# Patient Record
Sex: MALE | Race: WHITE | NOT HISPANIC OR LATINO | Employment: FULL TIME | URBAN - METROPOLITAN AREA
[De-identification: names, ages, dates, MRNs, and addresses within clinical notes are randomized per-mention and may not be internally consistent; named-entity substitution may affect disease eponyms.]

---

## 2023-06-28 DIAGNOSIS — K21.9 GASTROESOPHAGEAL REFLUX DISEASE, UNSPECIFIED WHETHER ESOPHAGITIS PRESENT: Primary | ICD-10-CM

## 2023-06-29 RX ORDER — PANTOPRAZOLE SODIUM 40 MG/1
TABLET, DELAYED RELEASE ORAL
Qty: 90 TABLET | Refills: 0 | Status: SHIPPED | OUTPATIENT
Start: 2023-06-29 | End: 2023-09-22

## 2023-09-20 DIAGNOSIS — K21.9 GASTROESOPHAGEAL REFLUX DISEASE, UNSPECIFIED WHETHER ESOPHAGITIS PRESENT: ICD-10-CM

## 2023-09-22 ENCOUNTER — TELEPHONE (OUTPATIENT)
Dept: PRIMARY CARE | Facility: CLINIC | Age: 58
End: 2023-09-22
Payer: COMMERCIAL

## 2023-09-22 RX ORDER — PANTOPRAZOLE SODIUM 40 MG/1
TABLET, DELAYED RELEASE ORAL
Qty: 30 TABLET | Refills: 0 | Status: SHIPPED | OUTPATIENT
Start: 2023-09-22 | End: 2023-10-17

## 2023-10-16 DIAGNOSIS — K21.9 GASTROESOPHAGEAL REFLUX DISEASE, UNSPECIFIED WHETHER ESOPHAGITIS PRESENT: ICD-10-CM

## 2023-10-17 RX ORDER — PANTOPRAZOLE SODIUM 40 MG/1
TABLET, DELAYED RELEASE ORAL
Qty: 30 TABLET | Refills: 0 | Status: SHIPPED | OUTPATIENT
Start: 2023-10-17 | End: 2024-01-17

## 2023-10-27 ENCOUNTER — OFFICE VISIT (OUTPATIENT)
Dept: PRIMARY CARE | Facility: CLINIC | Age: 58
End: 2023-10-27
Payer: COMMERCIAL

## 2023-10-27 VITALS
HEART RATE: 68 BPM | DIASTOLIC BLOOD PRESSURE: 74 MMHG | WEIGHT: 150.2 LBS | TEMPERATURE: 97.8 F | SYSTOLIC BLOOD PRESSURE: 114 MMHG | RESPIRATION RATE: 18 BRPM | OXYGEN SATURATION: 97 % | HEIGHT: 67 IN | BODY MASS INDEX: 23.57 KG/M2

## 2023-10-27 DIAGNOSIS — K22.719 BARRETT'S ESOPHAGUS WITH DYSPLASIA: ICD-10-CM

## 2023-10-27 DIAGNOSIS — Z12.5 SCREENING PSA (PROSTATE SPECIFIC ANTIGEN): ICD-10-CM

## 2023-10-27 DIAGNOSIS — K92.1 HEMATOCHEZIA: ICD-10-CM

## 2023-10-27 DIAGNOSIS — Z82.49 FAMILY HISTORY OF EARLY CAD: ICD-10-CM

## 2023-10-27 DIAGNOSIS — K21.9 GASTROESOPHAGEAL REFLUX DISEASE WITHOUT ESOPHAGITIS: ICD-10-CM

## 2023-10-27 DIAGNOSIS — J43.9 PULMONARY EMPHYSEMA, UNSPECIFIED EMPHYSEMA TYPE (MULTI): ICD-10-CM

## 2023-10-27 DIAGNOSIS — Z00.00 HEALTHCARE MAINTENANCE: Primary | ICD-10-CM

## 2023-10-27 DIAGNOSIS — D64.9 ANEMIA, UNSPECIFIED TYPE: ICD-10-CM

## 2023-10-27 PROBLEM — G43.109 OPHTHALMIC MIGRAINE: Status: ACTIVE | Noted: 2023-10-27

## 2023-10-27 PROBLEM — I73.00 RAYNAUD DISEASE: Status: ACTIVE | Noted: 2023-10-27

## 2023-10-27 LAB
POC APPEARANCE, URINE: CLEAR
POC BILIRUBIN, URINE: NEGATIVE
POC BLOOD, URINE: NEGATIVE
POC COLOR, URINE: YELLOW
POC GLUCOSE, URINE: NEGATIVE MG/DL
POC KETONES, URINE: NEGATIVE MG/DL
POC LEUKOCYTES, URINE: NEGATIVE
POC NITRITE,URINE: NEGATIVE
POC PH, URINE: 6 PH
POC PROTEIN, URINE: NEGATIVE MG/DL
POC SPECIFIC GRAVITY, URINE: 1.01
POC UROBILINOGEN, URINE: 0.2 EU/DL

## 2023-10-27 PROCEDURE — 93000 ELECTROCARDIOGRAM COMPLETE: CPT | Performed by: FAMILY MEDICINE

## 2023-10-27 PROCEDURE — 1036F TOBACCO NON-USER: CPT | Performed by: FAMILY MEDICINE

## 2023-10-27 PROCEDURE — 81002 URINALYSIS NONAUTO W/O SCOPE: CPT | Performed by: FAMILY MEDICINE

## 2023-10-27 PROCEDURE — 99396 PREV VISIT EST AGE 40-64: CPT | Performed by: FAMILY MEDICINE

## 2023-10-27 ASSESSMENT — PATIENT HEALTH QUESTIONNAIRE - PHQ9
SUM OF ALL RESPONSES TO PHQ9 QUESTIONS 1 AND 2: 0
2. FEELING DOWN, DEPRESSED OR HOPELESS: NOT AT ALL
1. LITTLE INTEREST OR PLEASURE IN DOING THINGS: NOT AT ALL

## 2023-10-27 ASSESSMENT — ENCOUNTER SYMPTOMS
HEADACHES: 0
SHORTNESS OF BREATH: 0

## 2023-10-27 NOTE — PROGRESS NOTES
"Subjective     Keenan Wharton is a 57 y.o. male who presents for Annual Exam.    HPI     Pt is here today for annual well exam.  He is doing well.    Review of Systems   Respiratory:  Negative for shortness of breath.    Cardiovascular:  Negative for chest pain.   Neurological:  Negative for headaches.       Objective     Vitals:    10/27/23 0854   BP: 114/74   BP Location: Left arm   Patient Position: Sitting   Pulse: 68   Resp: 18   Temp: 36.6 °C (97.8 °F)   TempSrc: Temporal   SpO2: 97%   Weight: 68.1 kg (150 lb 3.2 oz)   Height: 1.702 m (5' 7\")        Current Outpatient Medications   Medication Instructions    pantoprazole (ProtoNix) 40 mg EC tablet TAKE 1 TABLET BY MOUTH DAILY        Past Surgical History:   Procedure Laterality Date    OTHER SURGICAL HISTORY  08/11/2021    Esophagogastroduodenoscopy    OTHER SURGICAL HISTORY  08/11/2021    Colonoscopy        Social History     Tobacco Use    Smoking status: Never    Smokeless tobacco: Never        No family history on file.     Immunization History   Administered Date(s) Administered    Influenza Whole 11/29/2013    Influenza, High Dose Seasonal, Preservative Free 10/17/2014    Influenza, injectable, MDCK, preservative free, quadrivalent 12/28/2022    Influenza, injectable, quadrivalent 11/06/2017    Influenza, seasonal, injectable 10/09/2015, 10/12/2016, 11/14/2018, 09/25/2019, 10/20/2021    Pfizer COVID-19 vaccine, bivalent, age 12 years and older (30 mcg/0.3 mL) 10/16/2022    Pfizer Purple Cap SARS-CoV-2 03/16/2021, 04/06/2021, 12/20/2021    Tdap vaccine, age 7 year and older (BOOSTRIX) 08/12/2022    Zoster vaccine, recombinant, adult (SHINGRIX) 08/11/2021, 10/20/2021        Physical Exam  Vitals reviewed.   Constitutional:       General: He is not in acute distress.     Appearance: Normal appearance.   HENT:      Head: Normocephalic and atraumatic.      Right Ear: Tympanic membrane, ear canal and external ear normal.      Left Ear: Tympanic membrane, ear " canal and external ear normal.      Nose: Nose normal.      Mouth/Throat:      Mouth: Mucous membranes are moist.      Pharynx: Oropharynx is clear. No oropharyngeal exudate or posterior oropharyngeal erythema.   Eyes:      Extraocular Movements: Extraocular movements intact.      Pupils: Pupils are equal, round, and reactive to light.   Neck:      Thyroid: No thyroid mass or thyromegaly.   Cardiovascular:      Rate and Rhythm: Normal rate and regular rhythm.      Heart sounds: No murmur heard.  Pulmonary:      Effort: Pulmonary effort is normal. No respiratory distress.      Breath sounds: Normal breath sounds. No wheezing, rhonchi or rales.   Abdominal:      General: Abdomen is flat. There is no distension.      Palpations: Abdomen is soft.      Tenderness: There is no abdominal tenderness.   Genitourinary:     Testes: Normal.   Musculoskeletal:         General: Normal range of motion.   Lymphadenopathy:      Cervical: No cervical adenopathy.   Skin:     General: Skin is warm and dry.      Findings: No rash.   Neurological:      General: No focal deficit present.      Mental Status: He is alert and oriented to person, place, and time. Mental status is at baseline.   Psychiatric:         Mood and Affect: Mood normal.         Behavior: Behavior normal.         Problem List Items Addressed This Visit       Anemia    Relevant Orders    Referral to Benign Hematology    Iron and TIBC    Ferritin    Reticulocytes    Vitamin B12    Folate    Referral to Gastroenterology    Emphysema of lung (CMS/HCC)    Gastroesophageal reflux disease without esophagitis    Relevant Orders    Referral to Gastroenterology     Other Visit Diagnoses       Healthcare maintenance    -  Primary    Relevant Orders    Comprehensive Metabolic Panel    Lipid Panel    CBC and Auto Differential    Hemoglobin A1C    ECG 12 Lead (Completed)    POCT UA (nonautomated) manually resulted    Screening PSA (prostate specific antigen)        Relevant Orders     Prostate Specific Antigen    Hematochezia        Relevant Orders    Referral to Gastroenterology    Marie's esophagus with dysplasia        Relevant Orders    Referral to Gastroenterology    Family history of early CAD        Relevant Orders    Referral to Cardiology    ECG 12 Lead (Completed)            Assessment/Plan     Well Exam     Colon screening - utd with colonoscopy - 9/2020 ([facility]) - repeat 10 years per note; however pt having hematochezia so I recommend he see GI for possible colonoscopy.      Vaccines - utd with tdap and shingrix     Flu vaccine given today     I recommend yearly flu vaccine and routine COVID vaccinations as indicated     Complete labs    Healthy diet, routine exercise was discussed with patient      Anemia , chronic - sees  heme - on OTC fe supplement and vit C supplemnt   pt will follow up with heme and see GI    Barretts esophagus/GERD - last EGD 2020, see GI, referred , on PPI      skin screening - pt sees derm yearly , hx of AKs.     Family hx of MI in father, brothers, referred to cardio      Follow up in 1 year or sooner if needed

## 2023-10-30 ENCOUNTER — LAB (OUTPATIENT)
Dept: LAB | Facility: LAB | Age: 58
End: 2023-10-30
Payer: COMMERCIAL

## 2023-10-30 DIAGNOSIS — Z00.00 HEALTHCARE MAINTENANCE: ICD-10-CM

## 2023-10-30 DIAGNOSIS — D64.9 ANEMIA, UNSPECIFIED TYPE: ICD-10-CM

## 2023-10-30 DIAGNOSIS — Z12.5 SCREENING PSA (PROSTATE SPECIFIC ANTIGEN): ICD-10-CM

## 2023-10-30 LAB
ALBUMIN SERPL BCP-MCNC: 4.2 G/DL (ref 3.4–5)
ALP SERPL-CCNC: 45 U/L (ref 33–120)
ALT SERPL W P-5'-P-CCNC: 11 U/L (ref 10–52)
ANION GAP SERPL CALC-SCNC: 10 MMOL/L (ref 10–20)
AST SERPL W P-5'-P-CCNC: 15 U/L (ref 9–39)
BASOPHILS # BLD AUTO: 0.05 X10*3/UL (ref 0–0.1)
BASOPHILS NFR BLD AUTO: 1.1 %
BILIRUB SERPL-MCNC: 0.7 MG/DL (ref 0–1.2)
BUN SERPL-MCNC: 9 MG/DL (ref 6–23)
CALCIUM SERPL-MCNC: 9.3 MG/DL (ref 8.6–10.3)
CHLORIDE SERPL-SCNC: 104 MMOL/L (ref 98–107)
CHOLEST SERPL-MCNC: 196 MG/DL (ref 0–199)
CHOLESTEROL/HDL RATIO: 3.1
CO2 SERPL-SCNC: 30 MMOL/L (ref 21–32)
CREAT SERPL-MCNC: 0.86 MG/DL (ref 0.5–1.3)
EOSINOPHIL # BLD AUTO: 0.1 X10*3/UL (ref 0–0.7)
EOSINOPHIL NFR BLD AUTO: 2.3 %
ERYTHROCYTE [DISTWIDTH] IN BLOOD BY AUTOMATED COUNT: 12.6 % (ref 11.5–14.5)
EST. AVERAGE GLUCOSE BLD GHB EST-MCNC: 97 MG/DL
FERRITIN SERPL-MCNC: 39 NG/ML (ref 20–300)
FOLATE SERPL-MCNC: 17.4 NG/ML
GFR SERPL CREATININE-BSD FRML MDRD: >90 ML/MIN/1.73M*2
GLUCOSE SERPL-MCNC: 86 MG/DL (ref 74–99)
HBA1C MFR BLD: 5 %
HCT VFR BLD AUTO: 38.7 % (ref 41–52)
HDLC SERPL-MCNC: 63.8 MG/DL
HGB BLD-MCNC: 12.8 G/DL (ref 13.5–17.5)
HGB RETIC QN: 34 PG (ref 28–38)
IMM GRANULOCYTES # BLD AUTO: 0.01 X10*3/UL (ref 0–0.7)
IMM GRANULOCYTES NFR BLD AUTO: 0.2 % (ref 0–0.9)
IMMATURE RETIC FRACTION: 9.4 %
IRON SATN MFR SERPL: 26 % (ref 25–45)
IRON SERPL-MCNC: 112 UG/DL (ref 35–150)
LDLC SERPL CALC-MCNC: 116 MG/DL
LYMPHOCYTES # BLD AUTO: 1.13 X10*3/UL (ref 1.2–4.8)
LYMPHOCYTES NFR BLD AUTO: 25.7 %
MCH RBC QN AUTO: 30.5 PG (ref 26–34)
MCHC RBC AUTO-ENTMCNC: 33.1 G/DL (ref 32–36)
MCV RBC AUTO: 92 FL (ref 80–100)
MONOCYTES # BLD AUTO: 0.36 X10*3/UL (ref 0.1–1)
MONOCYTES NFR BLD AUTO: 8.2 %
NEUTROPHILS # BLD AUTO: 2.75 X10*3/UL (ref 1.2–7.7)
NEUTROPHILS NFR BLD AUTO: 62.5 %
NON HDL CHOLESTEROL: 132 MG/DL (ref 0–149)
NRBC BLD-RTO: 0 /100 WBCS (ref 0–0)
PLATELET # BLD AUTO: 238 X10*3/UL (ref 150–450)
PMV BLD AUTO: 10.8 FL (ref 7.5–11.5)
POTASSIUM SERPL-SCNC: 4 MMOL/L (ref 3.5–5.3)
PROT SERPL-MCNC: 6.5 G/DL (ref 6.4–8.2)
PSA SERPL-MCNC: 1.32 NG/ML
RBC # BLD AUTO: 4.2 X10*6/UL (ref 4.5–5.9)
RETICS #: 0.06 X10*6/UL (ref 0.02–0.12)
RETICS/RBC NFR AUTO: 1.5 % (ref 0.5–2)
SODIUM SERPL-SCNC: 140 MMOL/L (ref 136–145)
TIBC SERPL-MCNC: 434 UG/DL (ref 240–445)
TRIGL SERPL-MCNC: 80 MG/DL (ref 0–149)
UIBC SERPL-MCNC: 322 UG/DL (ref 110–370)
VIT B12 SERPL-MCNC: 285 PG/ML (ref 211–911)
VLDL: 16 MG/DL (ref 0–40)
WBC # BLD AUTO: 4.4 X10*3/UL (ref 4.4–11.3)

## 2023-10-30 PROCEDURE — 83036 HEMOGLOBIN GLYCOSYLATED A1C: CPT

## 2023-10-30 PROCEDURE — 83540 ASSAY OF IRON: CPT

## 2023-10-30 PROCEDURE — 82728 ASSAY OF FERRITIN: CPT

## 2023-10-30 PROCEDURE — 85045 AUTOMATED RETICULOCYTE COUNT: CPT

## 2023-10-30 PROCEDURE — 80053 COMPREHEN METABOLIC PANEL: CPT

## 2023-10-30 PROCEDURE — 36415 COLL VENOUS BLD VENIPUNCTURE: CPT

## 2023-10-30 PROCEDURE — 82746 ASSAY OF FOLIC ACID SERUM: CPT

## 2023-10-30 PROCEDURE — 80061 LIPID PANEL: CPT

## 2023-10-30 PROCEDURE — 82607 VITAMIN B-12: CPT

## 2023-10-30 PROCEDURE — 84153 ASSAY OF PSA TOTAL: CPT

## 2023-10-30 PROCEDURE — 85025 COMPLETE CBC W/AUTO DIFF WBC: CPT

## 2023-10-30 PROCEDURE — 83550 IRON BINDING TEST: CPT

## 2023-11-29 PROBLEM — R93.89 ABNORMAL CT SCAN: Status: ACTIVE | Noted: 2023-11-29

## 2023-11-29 PROBLEM — R35.1 NOCTURIA: Status: ACTIVE | Noted: 2023-11-29

## 2023-11-29 PROBLEM — D72.819 WBC DECREASED: Status: ACTIVE | Noted: 2023-11-29

## 2023-11-29 RX ORDER — RISEDRONATE SODIUM 150 MG/1
150 TABLET, FILM COATED ORAL
COMMUNITY
Start: 2020-06-23 | End: 2024-03-25 | Stop reason: WASHOUT

## 2023-11-29 RX ORDER — INFLUENZA A VIRUS A/DELAWARE/55/2019 CVR-45 (H1N1) ANTIGEN (MDCK CELL DERIVED, PROPIOLACTONE INACTIVATED), INFLUENZA A VIRUS A/DARWIN/11/2021 (H3N2) ANTIGEN (MDCK CELL DERIVED, PROPIOLACTONE INACTIVATED), INFLUENZA B VIRUS B/SINGAPORE/WUH4618/2021 ANTIGEN (MDCK CELL DERIVED, PROPIOLACTONE INACTIVATED), INFLUENZA B VIRUS B/SINGAPORE/INFTT-16-0610/2016 ANTIGEN (MDCK CELL DERIVED, PROPIOLACTONE INACTIVATED) 15; 15; 15; 15 UG/.5ML; UG/.5ML; UG/.5ML; UG/.5ML
INJECTION, SUSPENSION INTRAMUSCULAR
COMMUNITY
Start: 2022-12-28 | End: 2024-04-08 | Stop reason: WASHOUT

## 2023-11-29 RX ORDER — PHENOL 1.4 %
1 AEROSOL, SPRAY (ML) MUCOUS MEMBRANE DAILY
COMMUNITY
Start: 2021-08-11 | End: 2024-03-25 | Stop reason: WASHOUT

## 2023-11-30 ENCOUNTER — OFFICE VISIT (OUTPATIENT)
Dept: HEMATOLOGY/ONCOLOGY | Facility: CLINIC | Age: 58
End: 2023-11-30
Payer: COMMERCIAL

## 2023-11-30 VITALS
SYSTOLIC BLOOD PRESSURE: 112 MMHG | HEART RATE: 59 BPM | BODY MASS INDEX: 23.69 KG/M2 | DIASTOLIC BLOOD PRESSURE: 73 MMHG | OXYGEN SATURATION: 97 % | TEMPERATURE: 97.5 F | WEIGHT: 151.24 LBS | RESPIRATION RATE: 18 BRPM

## 2023-11-30 DIAGNOSIS — D64.9 ANEMIA, UNSPECIFIED TYPE: ICD-10-CM

## 2023-11-30 PROCEDURE — 99214 OFFICE O/P EST MOD 30 MIN: CPT | Performed by: PHYSICIAN ASSISTANT

## 2023-11-30 PROCEDURE — 1036F TOBACCO NON-USER: CPT | Performed by: PHYSICIAN ASSISTANT

## 2023-11-30 RX ORDER — FERROUS SULFATE 325(65) MG
65 TABLET, DELAYED RELEASE (ENTERIC COATED) ORAL
COMMUNITY

## 2023-11-30 ASSESSMENT — ENCOUNTER SYMPTOMS
DIFFICULTY URINATING: 0
ABDOMINAL DISTENTION: 0
DEPRESSION: 0
CONSTITUTIONAL NEGATIVE: 1
SCLERAL ICTERUS: 0
DIAPHORESIS: 0
FREQUENCY: 0
HEADACHES: 0
SPEECH DIFFICULTY: 0
OCCASIONAL FEELINGS OF UNSTEADINESS: 0
VOMITING: 0
CONFUSION: 0
CARDIOVASCULAR NEGATIVE: 1
DIARRHEA: 0
NAUSEA: 0
DIZZINESS: 0
HEMATURIA: 0
ABDOMINAL PAIN: 0
CHILLS: 0
HEMATOLOGIC/LYMPHATIC NEGATIVE: 1
WOUND: 0
DECREASED CONCENTRATION: 0
NECK STIFFNESS: 0
RESPIRATORY NEGATIVE: 1
LOSS OF SENSATION IN FEET: 0
EYE PROBLEMS: 0
CONSTIPATION: 0
APPETITE CHANGE: 0
NECK PAIN: 0
NUMBNESS: 0

## 2023-11-30 ASSESSMENT — PAIN SCALES - GENERAL: PAINLEVEL: 0-NO PAIN

## 2023-11-30 NOTE — PROGRESS NOTES
"Patient ID: Keenan Wharton is a 58 y.o. male.  Primary care physician:     Interval History:   9/28/2021: Initial visit   -h/o progressive normocytic anemia, starting July 2020. On 9/20/21  WBC 3.4, Hgb 10.2, Hct 32.8, MCV 84, Plt 226; iron 43, TIBC >493, sat NC, ferritin 12  -has been on a \"blood builder\"  supplement which contains iron for about 3 months.  -has noticed mild increase in fatigue, but especially less exercise tolerance with some HELMS. He is an active runner and has had to cut back. Possible restless legs at night.  -denies indigestion/reflux but is on protonix to control this. Denies n/v, c/d, melena, hematochezia. Occ BRBPR attributed to hemorrhoids noted on colonoscopy 9/2020. denies hematuria  -eats a regular diet, no gluten intolerance, weight stable.  -denies HELMS, chest pain, abd pain/distention.  -9/2020  colonoscopy/EGD: +hemorrhoids, otherwise unremarkable per patient    Selected labs prior to the initial consult:   11/11/2008  WBC 4.0, Hgb 12.3, Hct 38.1, Plt 212; iron 57, TIBC 459, sat 12%  6/14/2011 WBC 4.7, Hgb 13.3, Hct 40, Plt 203; iron 50, TIBC 363, sat 14%, ferr 30  10/22/2015  wBC 4.3, ANC 2.57, ALC 1.19, Hgb 13.4, Hct 40.8, Plt 189  1/26/2018  WBC 4.5, ANC 2.6, Hgb 13, Hct 39.5, Plt 210  12/3/2018  WBC 4.1, ANC 2.27, Hgb 13.1, Hct 39.7, MCV 94, Plt 198  9/27/2019  wBC 4.0, ANC 2.46, Hgb 13.3, Hct 41.1, Plt 206  7/2/2020  wBC 3.6, ANC 1.87, ALC 1.17, Hgb 12.2, Hct 37.2, MCV 93, Plt 194; tSH 1.25, PSA 1.48  8/13/2021  WBC 3.6, ANC 2.22, ALC 0.7, Hgb 10.4, Hct 33.8, MCV 86, Plt 224; Hep C neg; creat 0.96, Ca+ 9.3, t pro 7.2, t bili 0.4, ast 17, alt 10, alkp 43  9/20/2021  wBC 3.4, Hgb 10.2, Hct 32.8, MCV 84, Plt 226; retic 1.1; Vit B12 352, folate 21.3, iron 43, TIBC >493, sat NC, ferr 12    Sep, 2021: Advised patient to start on oral iron once per day.     8/1/22: , SPEP with IF normal,     Subjective    -Returned for follow up for anemia. Patient lost follow up since March, " 2022  -Clinically doing well today. Admits reasonable energy level. States that he is no longer feeling sluggish.   -States that he is taking blood builder and multi-vitamins daily.       Social History     Tobacco Use    Smoking status: Never    Smokeless tobacco: Never        Objective    BMI:   Body mass index is 23.69 kg/m².     Vitals:   Visit Vitals  Smoking Status Never     Vitals:    11/30/23 0807   BP: 112/73   Pulse: 59   Resp: 18   Temp: 36.4 °C (97.5 °F)   SpO2: 97%        Review of Systems   Constitutional: Negative.  Negative for appetite change, chills and diaphoresis.   HENT:   Negative for lump/mass, mouth sores and nosebleeds.    Eyes:  Negative for eye problems and icterus.   Respiratory: Negative.     Cardiovascular: Negative.    Gastrointestinal:  Negative for abdominal distention, abdominal pain, constipation, diarrhea, nausea and vomiting.   Genitourinary:  Negative for bladder incontinence, difficulty urinating, frequency and hematuria.    Musculoskeletal:  Negative for gait problem, neck pain and neck stiffness.   Skin:  Negative for itching, rash and wound.   Neurological:  Negative for dizziness, gait problem, headaches, numbness and speech difficulty.   Hematological: Negative.    Psychiatric/Behavioral:  Negative for confusion and decreased concentration.         Physical Exam  Constitutional:       General: He is not in acute distress.     Appearance: Normal appearance.   HENT:      Head: Normocephalic and atraumatic.      Nose: Nose normal.      Mouth/Throat:      Mouth: Mucous membranes are moist.      Pharynx: Oropharynx is clear. No oropharyngeal exudate or posterior oropharyngeal erythema.   Eyes:      General: No scleral icterus.     Extraocular Movements: Extraocular movements intact.      Conjunctiva/sclera: Conjunctivae normal.   Cardiovascular:      Rate and Rhythm: Normal rate and regular rhythm.   Pulmonary:      Effort: Pulmonary effort is normal. No respiratory distress.  "     Breath sounds: Normal breath sounds. No wheezing.   Musculoskeletal:         General: No swelling, deformity or signs of injury. Normal range of motion.      Cervical back: Normal range of motion.   Skin:     General: Skin is warm and dry.      Coloration: Skin is not jaundiced.      Findings: No bruising, lesion or rash.   Neurological:      General: No focal deficit present.      Mental Status: He is alert and oriented to person, place, and time. Mental status is at baseline.         Labs:  Lab Results   Component Value Date    WBC 4.4 10/30/2023    NEUTROABS 2.75 10/30/2023    IGABSOL 0.01 10/30/2023    LYMPHSABS 1.13 (L) 10/30/2023    MONOSABS 0.36 10/30/2023    EOSABS 0.10 10/30/2023    BASOSABS 0.05 10/30/2023    RBC 4.20 (L) 10/30/2023    MCV 92 10/30/2023    MCHC 33.1 10/30/2023    HGB 12.8 (L) 10/30/2023    HCT 38.7 (L) 10/30/2023     10/30/2023     Lab Results   Component Value Date    RETICCTPCT 1.5 10/30/2023      Lab Results   Component Value Date    CREATININE 0.86 10/30/2023    BUN 9 10/30/2023    EGFR >90 10/30/2023     10/30/2023    K 4.0 10/30/2023     10/30/2023    CO2 30 10/30/2023      Lab Results   Component Value Date    ALT 11 10/30/2023    AST 15 10/30/2023    ALKPHOS 45 10/30/2023    BILITOT 0.7 10/30/2023      Lab Results   Component Value Date    TSH 1.25 07/02/2020     Lab Results   Component Value Date    TSH 1.25 07/02/2020     Lab Results   Component Value Date    IRON 112 10/30/2023    TIBC 434 10/30/2023    FERRITIN 39 10/30/2023      Lab Results   Component Value Date    FFLQWAFO69 285 10/30/2023      Lab Results   Component Value Date    FOLATE 17.4 10/30/2023     Lab Results   Component Value Date     08/01/2022     No results found for: \"HAPTOGLOBIN\"  Lab Results   Component Value Date    SPEP NORMAL 08/01/2022          Performance Status:  Asymptomatic    Assessment/Plan      Anemia  -normocytic anemia  -Labs done on 10/30/23: Hgb 12.8, MCV 92, " ferritin 39, iron saturation 26%, reticulocyte counts 1.5%, vitamin B12 285, folate 17.4, creatinine 0.86  -Previously plasma dyscrasia excluded.    -Currently, patient is taking blood builder and multi-vitamins daily.   -Since his iron level and vitamin B12 are borderline low, advised patient to start on ferrous sulfate one tab per day and vitamin B12 1000 mcg once per day.   -I will see him back in 3 months to follow up with labs done a few days prior to the next vsiit.     2. GERD   -Currently on pantoprazole     Problem List Items Addressed This Visit             ICD-10-CM    Anemia D64.9    Relevant Orders    Vitamin B12    Iron and TIBC    Ferritin    CBC and Auto Differential    Clinic Appointment Request Follow Up; MAGDA PEDRAZA; [facility]            Magda Pedraza PA-C

## 2023-11-30 NOTE — PATIENT INSTRUCTIONS
You will start on ferrous sulfate 325 mg, once per day and vitamin B12 1000 mcg once per day.     I will see you back in 3 months to follow up with labs done a few days prior to the next visit.

## 2024-01-16 DIAGNOSIS — K21.9 GASTROESOPHAGEAL REFLUX DISEASE, UNSPECIFIED WHETHER ESOPHAGITIS PRESENT: ICD-10-CM

## 2024-01-17 RX ORDER — PANTOPRAZOLE SODIUM 40 MG/1
TABLET, DELAYED RELEASE ORAL
Qty: 90 TABLET | Refills: 3 | Status: SHIPPED | OUTPATIENT
Start: 2024-01-17

## 2024-02-26 ENCOUNTER — TELEPHONE (OUTPATIENT)
Dept: HEMATOLOGY/ONCOLOGY | Facility: CLINIC | Age: 59
End: 2024-02-26
Payer: COMMERCIAL

## 2024-02-26 ENCOUNTER — LAB (OUTPATIENT)
Dept: LAB | Facility: CLINIC | Age: 59
End: 2024-02-26
Payer: COMMERCIAL

## 2024-02-26 DIAGNOSIS — D64.9 ANEMIA, UNSPECIFIED TYPE: ICD-10-CM

## 2024-02-26 DIAGNOSIS — D64.9 ANEMIA, UNSPECIFIED TYPE: Primary | ICD-10-CM

## 2024-02-26 LAB
BASOPHILS # BLD AUTO: 0.04 X10*3/UL (ref 0–0.1)
BASOPHILS NFR BLD AUTO: 0.8 %
EOSINOPHIL # BLD AUTO: 0.06 X10*3/UL (ref 0–0.7)
EOSINOPHIL NFR BLD AUTO: 1.2 %
ERYTHROCYTE [DISTWIDTH] IN BLOOD BY AUTOMATED COUNT: 12.5 % (ref 11.5–14.5)
FERRITIN SERPL-MCNC: 21 NG/ML (ref 20–300)
HCT VFR BLD AUTO: 34.7 % (ref 41–52)
HGB BLD-MCNC: 11.8 G/DL (ref 13.5–17.5)
IMM GRANULOCYTES # BLD AUTO: 0.01 X10*3/UL (ref 0–0.7)
IMM GRANULOCYTES NFR BLD AUTO: 0.2 % (ref 0–0.9)
IRON SATN MFR SERPL: 6 % (ref 25–45)
IRON SERPL-MCNC: 26 UG/DL (ref 35–150)
LYMPHOCYTES # BLD AUTO: 1.08 X10*3/UL (ref 1.2–4.8)
LYMPHOCYTES NFR BLD AUTO: 20.8 %
MCH RBC QN AUTO: 31 PG (ref 26–34)
MCHC RBC AUTO-ENTMCNC: 34 G/DL (ref 32–36)
MCV RBC AUTO: 91 FL (ref 80–100)
MONOCYTES # BLD AUTO: 0.38 X10*3/UL (ref 0.1–1)
MONOCYTES NFR BLD AUTO: 7.3 %
NEUTROPHILS # BLD AUTO: 3.62 X10*3/UL (ref 1.2–7.7)
NEUTROPHILS NFR BLD AUTO: 69.7 %
NRBC BLD-RTO: ABNORMAL /100{WBCS}
PLATELET # BLD AUTO: 231 X10*3/UL (ref 150–450)
RBC # BLD AUTO: 3.81 X10*6/UL (ref 4.5–5.9)
TIBC SERPL-MCNC: 425 UG/DL (ref 240–445)
UIBC SERPL-MCNC: 399 UG/DL (ref 110–370)
VIT B12 SERPL-MCNC: 647 PG/ML (ref 211–911)
WBC # BLD AUTO: 5.2 X10*3/UL (ref 4.4–11.3)

## 2024-02-26 PROCEDURE — 85025 COMPLETE CBC W/AUTO DIFF WBC: CPT

## 2024-02-26 PROCEDURE — 82728 ASSAY OF FERRITIN: CPT

## 2024-02-26 PROCEDURE — 82607 VITAMIN B-12: CPT

## 2024-02-26 PROCEDURE — 36415 COLL VENOUS BLD VENIPUNCTURE: CPT

## 2024-02-26 PROCEDURE — 83540 ASSAY OF IRON: CPT

## 2024-02-29 ENCOUNTER — APPOINTMENT (OUTPATIENT)
Dept: HEMATOLOGY/ONCOLOGY | Facility: CLINIC | Age: 59
End: 2024-02-29
Payer: COMMERCIAL

## 2024-03-20 ENCOUNTER — OFFICE VISIT (OUTPATIENT)
Dept: GASTROENTEROLOGY | Facility: CLINIC | Age: 59
End: 2024-03-20
Payer: COMMERCIAL

## 2024-03-20 VITALS
WEIGHT: 150 LBS | DIASTOLIC BLOOD PRESSURE: 85 MMHG | BODY MASS INDEX: 23.54 KG/M2 | SYSTOLIC BLOOD PRESSURE: 142 MMHG | HEIGHT: 67 IN | HEART RATE: 74 BPM

## 2024-03-20 DIAGNOSIS — D50.0 IRON DEFICIENCY ANEMIA DUE TO CHRONIC BLOOD LOSS: Primary | ICD-10-CM

## 2024-03-20 DIAGNOSIS — Z12.11 COLON CANCER SCREENING: ICD-10-CM

## 2024-03-20 PROCEDURE — 1036F TOBACCO NON-USER: CPT | Performed by: STUDENT IN AN ORGANIZED HEALTH CARE EDUCATION/TRAINING PROGRAM

## 2024-03-20 PROCEDURE — 99205 OFFICE O/P NEW HI 60 MIN: CPT | Performed by: STUDENT IN AN ORGANIZED HEALTH CARE EDUCATION/TRAINING PROGRAM

## 2024-03-20 RX ORDER — SODIUM, POTASSIUM,MAG SULFATES 17.5-3.13G
1 SOLUTION, RECONSTITUTED, ORAL ORAL SEE ADMIN INSTRUCTIONS
Qty: 2 EACH | Refills: 0 | Status: SHIPPED | OUTPATIENT
Start: 2024-03-20 | End: 2024-05-15 | Stop reason: ALTCHOICE

## 2024-03-20 RX ORDER — CYANOCOBALAMIN (VITAMIN B-12) 250 MCG
250 TABLET ORAL DAILY
COMMUNITY

## 2024-03-20 NOTE — PROGRESS NOTES
Subjective     History of Present Illness:   Keenan Wharton is a 58 y.o. male with hx of emphysema, Raynaud disease and chronic NATIVIDAD dating back to 2021 who presents to clinic for evaluation of worsening hematochezia.     Patient states he has been on iron supplementation for the past year however his ferritin most recently was measured at 21.  His most concerning symptom at this time is that he has been having ongoing hematochezia at times filling out the toilet bowl.  The hematochezia is intermittent, he has not had any episodes of blood in his stool this past week.  He has no abdominal pain, weight loss.  He has no melena.  He has no nausea or vomiting.    He completed upper endoscopy and colonoscopy in 2020 with concern for BE (C1M1) but biopsies were negative for intestinal metaplasia. Colonoscopy showed internal hemorrhoids. He has no family hx of GI malignancy      Past Medical History   has no past medical history on file.     Social History   reports that he has never smoked. He has never used smokeless tobacco. He reports current alcohol use. He reports that he does not currently use drugs.     Family History  family history is not on file.     Allergies  Allergies   Allergen Reactions    Lactose Nausea/vomiting     Lactose intolerant       Medications  Current Outpatient Medications   Medication Instructions    cyanocobalamin (VITAMIN B-12) 250 mcg, oral, Daily    ferrous sulfate 65 mg, oral, Daily with breakfast, Do not crush, chew, or split.    Flucelvax Quad [id number], PF, 60 mcg (15 mcg x 4)/0.5 mL syringe     multivit,calc,min/FA/K1/lycop (ONE-A-DAY MEN'S COMPLETE ORAL) oral    multivitamin with minerals (Adults Multivitamin) tablet 1 tablet, oral, Daily    pantoprazole (ProtoNix) 40 mg EC tablet TAKE 1 TABLET BY MOUTH DAILY    risedronate (ACTONEL) 150 mg, oral, Every 30 days    sodium,potassium,mag sulfates (Suprep) 17.5-3.13-1.6 gram recon soln solution 2 bottles, oral, See admin instructions         Objective   Visit Vitals  /85   Pulse 74      Physical Exam  Vitals reviewed.   Constitutional:       Appearance: Normal appearance.   HENT:      Head: Normocephalic.      Mouth/Throat:      Mouth: Mucous membranes are moist.   Cardiovascular:      Rate and Rhythm: Normal rate and regular rhythm.   Pulmonary:      Effort: Pulmonary effort is normal.      Breath sounds: Normal breath sounds.   Abdominal:      General: Abdomen is flat.   Neurological:      General: No focal deficit present.      Mental Status: He is alert.   Psychiatric:         Mood and Affect: Mood normal.         Judgment: Judgment normal.         Assessment/Plan   Keenan Wharton is a 58 y.o. male with hx of emphysema, Raynaud disease and chronic NATIVIDAD dating back to 2021 who presents to clinic for evaluation of worsening hematochezia and ongoing NATIVIDAD despite iron supplementation    He has ongoing iron deficiency anemia despite iron supplementation for the past year.  Given worsening hematochezia we will plan on repeat bidirectional endoscopy.  If this is unremarkable will need small bowel evaluation to complete workup.    Problem List Items Addressed This Visit       Anemia - Primary    Relevant Orders    Colonoscopy Diagnostic (hematochezia)    EGD              Massiel Godwin MD         My final recommendations will be communicated back to the requesting physician by way of shared Medical record or letter to requesting physician via fax.

## 2024-03-24 ASSESSMENT — ENCOUNTER SYMPTOMS
NUMBNESS: 0
HEMATURIA: 0
NECK STIFFNESS: 0
CHILLS: 0
NECK PAIN: 0
ABDOMINAL PAIN: 0
ABDOMINAL DISTENTION: 0
DIAPHORESIS: 0
WOUND: 0
FREQUENCY: 0
RESPIRATORY NEGATIVE: 1
DIZZINESS: 0
HEADACHES: 0
CONSTIPATION: 0
CONSTITUTIONAL NEGATIVE: 1
APPETITE CHANGE: 0
NAUSEA: 0
DECREASED CONCENTRATION: 0
SCLERAL ICTERUS: 0
DIFFICULTY URINATING: 0
CONFUSION: 0
DIARRHEA: 0
HEMATOLOGIC/LYMPHATIC NEGATIVE: 1
VOMITING: 0
EYE PROBLEMS: 0
SPEECH DIFFICULTY: 0
CARDIOVASCULAR NEGATIVE: 1

## 2024-03-25 ENCOUNTER — OFFICE VISIT (OUTPATIENT)
Dept: HEMATOLOGY/ONCOLOGY | Facility: CLINIC | Age: 59
End: 2024-03-25
Payer: COMMERCIAL

## 2024-03-25 VITALS
HEART RATE: 59 BPM | SYSTOLIC BLOOD PRESSURE: 112 MMHG | WEIGHT: 150.24 LBS | BODY MASS INDEX: 23.53 KG/M2 | DIASTOLIC BLOOD PRESSURE: 73 MMHG | RESPIRATION RATE: 16 BRPM | OXYGEN SATURATION: 99 % | TEMPERATURE: 96.6 F

## 2024-03-25 DIAGNOSIS — D64.9 ANEMIA, UNSPECIFIED TYPE: ICD-10-CM

## 2024-03-25 PROCEDURE — 1036F TOBACCO NON-USER: CPT

## 2024-03-25 PROCEDURE — 99214 OFFICE O/P EST MOD 30 MIN: CPT

## 2024-03-25 ASSESSMENT — COLUMBIA-SUICIDE SEVERITY RATING SCALE - C-SSRS
6. HAVE YOU EVER DONE ANYTHING, STARTED TO DO ANYTHING, OR PREPARED TO DO ANYTHING TO END YOUR LIFE?: NO
1. IN THE PAST MONTH, HAVE YOU WISHED YOU WERE DEAD OR WISHED YOU COULD GO TO SLEEP AND NOT WAKE UP?: NO
2. HAVE YOU ACTUALLY HAD ANY THOUGHTS OF KILLING YOURSELF?: NO

## 2024-03-25 ASSESSMENT — PATIENT HEALTH QUESTIONNAIRE - PHQ9
1. LITTLE INTEREST OR PLEASURE IN DOING THINGS: NOT AT ALL
SUM OF ALL RESPONSES TO PHQ9 QUESTIONS 1 AND 2: 0
2. FEELING DOWN, DEPRESSED OR HOPELESS: NOT AT ALL

## 2024-03-25 ASSESSMENT — PAIN SCALES - GENERAL: PAINLEVEL: 0-NO PAIN

## 2024-03-25 NOTE — PROGRESS NOTES
"Patient ID: Keenan Wharton is a 58 y.o. male.  Primary care physician:     Interval History:   9/28/2021: Initial visit   -h/o progressive normocytic anemia, starting July 2020. On 9/20/21  WBC 3.4, Hgb 10.2, Hct 32.8, MCV 84, Plt 226; iron 43, TIBC >493, sat NC, ferritin 12  -has been on a \"blood builder\"  supplement which contains iron for about 3 months.  -has noticed mild increase in fatigue, but especially less exercise tolerance with some HELMS. He is an active runner and has had to cut back. Possible restless legs at night.  -denies indigestion/reflux but is on protonix to control this. Denies n/v, c/d, melena, hematochezia. Occ BRBPR attributed to hemorrhoids noted on colonoscopy 9/2020. denies hematuria  -eats a regular diet, no gluten intolerance, weight stable.  -denies HELMS, chest pain, abd pain/distention.  -9/2020  colonoscopy/EGD: +hemorrhoids, otherwise unremarkable per patient    Selected labs prior to the initial consult:   11/11/2008  WBC 4.0, Hgb 12.3, Hct 38.1, Plt 212; iron 57, TIBC 459, sat 12%  6/14/2011 WBC 4.7, Hgb 13.3, Hct 40, Plt 203; iron 50, TIBC 363, sat 14%, ferr 30  10/22/2015  wBC 4.3, ANC 2.57, ALC 1.19, Hgb 13.4, Hct 40.8, Plt 189  1/26/2018  WBC 4.5, ANC 2.6, Hgb 13, Hct 39.5, Plt 210  12/3/2018  WBC 4.1, ANC 2.27, Hgb 13.1, Hct 39.7, MCV 94, Plt 198  9/27/2019  wBC 4.0, ANC 2.46, Hgb 13.3, Hct 41.1, Plt 206  7/2/2020  wBC 3.6, ANC 1.87, ALC 1.17, Hgb 12.2, Hct 37.2, MCV 93, Plt 194; tSH 1.25, PSA 1.48  8/13/2021  WBC 3.6, ANC 2.22, ALC 0.7, Hgb 10.4, Hct 33.8, MCV 86, Plt 224; Hep C neg; creat 0.96, Ca+ 9.3, t pro 7.2, t bili 0.4, ast 17, alt 10, alkp 43  9/20/2021  wBC 3.4, Hgb 10.2, Hct 32.8, MCV 84, Plt 226; retic 1.1; Vit B12 352, folate 21.3, iron 43, TIBC >493, sat NC, ferr 12    Sep, 2021: Advised patient to start on oral iron once per day.     8/1/22: , SPEP with IF normal,     Subjective    11/30/24:  -Returned for follow up for anemia. Patient lost follow up since " March, 2022  -Clinically doing well today. Admits reasonable energy level. States that he is no longer feeling sluggish.   -States that he is taking blood builder and multi-vitamins daily.     3/25/2024:   - Patient presents for follow-up visit, his previous provider relocated therefore I have taken over his care, this is our first encounter together  - Patient reports that he saw GI last week due to ongoing hematochezia, they recommended a colonoscopy and EGD  - Patient states that he has not had any hematochezia in the last 2 weeks  - Patient reports he has a family history of esophageal cancer, he does have reflux but says it is controlled and denies dysphagia  - Patient reports that he has been feeling well and that his energy is good  - He states that he has no complaints or symptoms going on, and denies any changes or hospital stays since last visit  - Patient denies shortness of breath, chest pain, heart palpitations, melena, nausea, vomiting, constipation, diarrhea, fevers, chills, new lumps or bumps, edema or concerns  - Patient is on Protonix 40 mg daily, iron supplementation tablet with vitamin C daily, and vitamin B12 supplemental tablet once daily      Social History     Tobacco Use    Smoking status: Never    Smokeless tobacco: Never   Substance Use Topics    Alcohol use: Yes     Comment: 10 drinks a week    Drug use: Not Currently        Objective    Visit Vitals  /73 (BP Location: Left arm, Patient Position: Sitting)   Pulse 59   Temp 35.9 °C (96.6 °F)   Resp 16   Wt 68.2 kg (150 lb 3.9 oz)   SpO2 99%   BMI 23.53 kg/m²   Smoking Status Never   BSA 1.8 m²           Review of Systems   Constitutional: Negative.  Negative for appetite change, chills and diaphoresis.   HENT:   Negative for lump/mass, mouth sores and nosebleeds.    Eyes:  Negative for eye problems and icterus.   Respiratory: Negative.     Cardiovascular: Negative.    Gastrointestinal:  Negative for abdominal distention, abdominal  pain, constipation, diarrhea, nausea and vomiting.   Genitourinary:  Negative for bladder incontinence, difficulty urinating, frequency and hematuria.    Musculoskeletal:  Negative for gait problem, neck pain and neck stiffness.   Skin:  Negative for itching, rash and wound.   Neurological:  Negative for dizziness, gait problem, headaches, numbness and speech difficulty.   Hematological: Negative.    Psychiatric/Behavioral:  Negative for confusion and decreased concentration.         Physical Exam  Constitutional:       General: He is not in acute distress.     Appearance: Normal appearance.   HENT:      Head: Normocephalic and atraumatic.      Nose: Nose normal.      Mouth/Throat:      Mouth: Mucous membranes are moist.      Pharynx: Oropharynx is clear. No oropharyngeal exudate or posterior oropharyngeal erythema.   Eyes:      General: No scleral icterus.     Extraocular Movements: Extraocular movements intact.      Conjunctiva/sclera: Conjunctivae normal.   Cardiovascular:      Rate and Rhythm: Normal rate and regular rhythm.   Pulmonary:      Effort: Pulmonary effort is normal. No respiratory distress.      Breath sounds: Normal breath sounds. No wheezing.   Musculoskeletal:         General: No swelling, deformity or signs of injury. Normal range of motion.      Cervical back: Normal range of motion.   Skin:     General: Skin is warm and dry.      Coloration: Skin is not jaundiced.      Findings: No bruising, lesion or rash.   Neurological:      General: No focal deficit present.      Mental Status: He is alert and oriented to person, place, and time. Mental status is at baseline.           Performance Status:  Asymptomatic    Assessment/Plan      Anemia  -normocytic anemia since at least July 2020  -Labs done on 10/30/23: Hgb 12.8, MCV 92, ferritin 39, iron saturation 26%, reticulocyte counts 1.5%, vitamin B12 285, folate 17.4, creatinine 0.86  -Previously plasma dyscrasia excluded.    -Currently, patient is  taking blood builder (contains iron possibly) and multi-vitamins daily.   -Since his iron level and vitamin B12 are borderline low, advised patient to start on ferrous sulfate one tab per day and vitamin B12 1000 mcg once per day.   -I will see him back in 3 months to follow up with labs done a few days prior to the next vsiit.     3/25/2024:   - PMH emphysema, raynaud disease and chronic NATIVIDAD dating to 2021   - Saw GI 3/20/24 for worsening hematochezia   - Labs from 2/26/24 show: WBC 5.2, hgb 11.8, plt 231,000, lymphocytes 1080, vitamin B12 647, iron saturation 6%, ferritin 21  - Going to have EGD and colonoscopy in about a month   - On protonix 40 mg orally daily   - On ferrous sulfate and vitamin B12 daily   - Has not had rectal bleeding for the last 2 weeks  - Reports feeling well, energy is good, appetite is intact with no unintentional weight loss  - Patient's labs were done about 4 weeks ago, based on his improvement of bleeding and overall feeling well with stable VSS he prefers to hold off on further lab work today   - Therefore, we will have him repeat labs 8 weeks from last set of labs (to equal 12 weeks apart) which would be about the end of May with a phone visit to review labs         2. GERD/Marie's Esophagus   -Currently on pantoprazole         Diagnoses and all orders for this visit:  Anemia, unspecified type  -     Clinic Appointment Request Follow Up; MAGDA PEDRAZA; [id number]  -     Clinic Appointment Request Other (comment) (phone visit); ALLYSSA JARRELL; Future  -     Iron and TIBC; Future  -     CBC and Auto Differential; Future  -     Vitamin B12; Future  -     Ferritin; Future           CURT Granda-CNP

## 2024-04-08 ENCOUNTER — OFFICE VISIT (OUTPATIENT)
Dept: CARDIOLOGY | Facility: CLINIC | Age: 59
End: 2024-04-08
Payer: COMMERCIAL

## 2024-04-08 VITALS
BODY MASS INDEX: 23.81 KG/M2 | OXYGEN SATURATION: 99 % | SYSTOLIC BLOOD PRESSURE: 118 MMHG | WEIGHT: 152 LBS | HEART RATE: 65 BPM | DIASTOLIC BLOOD PRESSURE: 71 MMHG

## 2024-04-08 DIAGNOSIS — E78.49 OTHER HYPERLIPIDEMIA: Primary | ICD-10-CM

## 2024-04-08 DIAGNOSIS — Z82.49 FAMILY HISTORY OF EARLY CAD: ICD-10-CM

## 2024-04-08 PROCEDURE — 93005 ELECTROCARDIOGRAM TRACING: CPT | Performed by: INTERNAL MEDICINE

## 2024-04-08 PROCEDURE — 1036F TOBACCO NON-USER: CPT | Performed by: INTERNAL MEDICINE

## 2024-04-08 PROCEDURE — 99204 OFFICE O/P NEW MOD 45 MIN: CPT | Performed by: INTERNAL MEDICINE

## 2024-04-08 PROCEDURE — 93010 ELECTROCARDIOGRAM REPORT: CPT | Performed by: INTERNAL MEDICINE

## 2024-04-08 PROCEDURE — 99214 OFFICE O/P EST MOD 30 MIN: CPT | Performed by: INTERNAL MEDICINE

## 2024-04-08 ASSESSMENT — ENCOUNTER SYMPTOMS
DEPRESSION: 0
OCCASIONAL FEELINGS OF UNSTEADINESS: 0
LOSS OF SENSATION IN FEET: 0

## 2024-04-08 ASSESSMENT — PAIN SCALES - GENERAL: PAINLEVEL: 0-NO PAIN

## 2024-04-08 NOTE — PROGRESS NOTES
CHIEF COMPLAINT: new consult for prevention/strong family hx of CAD    HISTORY OF PRESENT ILLNESS:    PCP: Dr. Keenan Easley    Mr. Wharton is a 57 yo F with hx of GERD/?Marie's and anemia who is newly referred to Cardiology Clinic by PCP to establish care/prevention given strong family hx of CAD. He feels well and denies CP, SOB, dizziness, LH, LE edema, or palpitations. Active working out about 4 times per week (running or bootcamp). Does not eat red meat other than occasional game, but does eat chicken and pork. Loves ice cream and eats regularly. ECG shows NSR. Drinks 8-10 ETOH drinks per week. Saw Cardiology Dr. Trimble at [facility] in 2018 for prevention and had screening AAA US (negative) done given brother had AAA. Prior cardiac testing per patient is treadmill stress test in his 30s (negative) for MSK pain and nuclear stress test in his 40s. Dad had first MI in his late 40s and brother also had MI in his 40s s/p multiple stents and an ICD.    PRIOR CARDIAC TESTING:  -[facility] (2022): 0    Allergies   Allergen Reactions    Lactose Nausea/vomiting     Lactose intolerant     Current Outpatient Medications:     cyanocobalamin (Vitamin B-12) 250 mcg tablet, Take 1 tablet (250 mcg) by mouth once daily., Disp: , Rfl:     ferrous sulfate 325 (65 Fe) MG EC tablet, Take 65 mg by mouth once daily with a meal. Do not crush, chew, or split., Disp: , Rfl:     pantoprazole (ProtoNix) 40 mg EC tablet, TAKE 1 TABLET BY MOUTH DAILY, Disp: 90 tablet, Rfl: 3    sodium,potassium,mag sulfates (Suprep) 17.5-3.13-1.6 gram recon soln solution, Take 2 bottles by mouth see administration instructions., Disp: 2 each, Rfl: 0    /71   Pulse 65   Wt 68.9 kg (152 lb)   SpO2 99%   BMI 23.81 kg/m²     PHYSICAL EXAM:  GENERAL: NAD  HEENT: no JVD  CV: RRR without m/r/g  PULM: CTAB  EXT: non-edematous bilateral lower extremities     LABS  WBC (x10*3/uL)   Date Value   02/26/2024 5.2     Hemoglobin (g/dL)   Date Value   02/26/2024 11.8 (L)      Platelets (x10*3/uL)   Date Value   02/26/2024 231     Sodium (mmol/L)   Date Value   10/30/2023 140     Potassium (mmol/L)   Date Value   10/30/2023 4.0     Chloride (mmol/L)   Date Value   10/30/2023 104     Bicarbonate (mmol/L)   Date Value   10/30/2023 30     Urea Nitrogen (mg/dL)   Date Value   10/30/2023 9     Creatinine (mg/dL)   Date Value   10/30/2023 0.86     Calcium (mg/dL)   Date Value   10/30/2023 9.3     Total Protein (g/dL)   Date Value   10/30/2023 6.5     Bilirubin, Total (mg/dL)   Date Value   10/30/2023 0.7     Alkaline Phosphatase (U/L)   Date Value   10/30/2023 45     ALT (U/L)   Date Value   10/30/2023 11     AST (U/L)   Date Value   10/30/2023 15     Glucose (mg/dL)   Date Value   10/30/2023 86     Cholesterol (mg/dL)   Date Value   10/30/2023 196   08/23/2022 191   08/13/2021 187   07/02/2020 179     LDL Calculated (mg/dL)   Date Value   10/30/2023 116 (H)     HDL-Cholesterol (mg/dL)   Date Value   10/30/2023 63.8     HDL (mg/dL)   Date Value   08/23/2022 68.0   08/13/2021 62.0   07/02/2020 60.1     Triglycerides (mg/dL)   Date Value   10/30/2023 80   08/23/2022 60   08/13/2021 64   07/02/2020 67     Hemoglobin A1C (%)   Date Value   10/30/2023 5.0     Lab Results   Component Value Date    LDLF 111 (H) 08/23/2022     ASSESSMENT/PLAN: 57 yo F with hx of GERD/?Marie's and anemia here to establish care/prevention given strong family hx of CAD. Discussed CV risk factor reduction at length. Exercise and majority of diet is great; will cut out some ice cream to help lower . Check Lpa given strong family hx. RTC prn.    Check lpa, rtc prn

## 2024-04-11 LAB
ATRIAL RATE: 68 BPM
P AXIS: 73 DEGREES
P OFFSET: 188 MS
P ONSET: 134 MS
PR INTERVAL: 172 MS
Q ONSET: 220 MS
QRS COUNT: 11 BEATS
QRS DURATION: 88 MS
QT INTERVAL: 386 MS
QTC CALCULATION(BAZETT): 410 MS
QTC FREDERICIA: 402 MS
R AXIS: 74 DEGREES
T AXIS: 62 DEGREES
T OFFSET: 413 MS
VENTRICULAR RATE: 68 BPM

## 2024-04-12 ENCOUNTER — ANESTHESIA EVENT (OUTPATIENT)
Dept: GASTROENTEROLOGY | Facility: EXTERNAL LOCATION | Age: 59
End: 2024-04-12

## 2024-04-23 ENCOUNTER — ANESTHESIA (OUTPATIENT)
Dept: GASTROENTEROLOGY | Facility: EXTERNAL LOCATION | Age: 59
End: 2024-04-23

## 2024-04-23 ENCOUNTER — HOSPITAL ENCOUNTER (OUTPATIENT)
Dept: GASTROENTEROLOGY | Facility: EXTERNAL LOCATION | Age: 59
Discharge: HOME | End: 2024-04-23
Payer: COMMERCIAL

## 2024-04-23 VITALS
WEIGHT: 141 LBS | HEART RATE: 76 BPM | TEMPERATURE: 98.1 F | HEIGHT: 67 IN | RESPIRATION RATE: 13 BRPM | BODY MASS INDEX: 22.13 KG/M2 | OXYGEN SATURATION: 98 % | DIASTOLIC BLOOD PRESSURE: 71 MMHG | SYSTOLIC BLOOD PRESSURE: 107 MMHG

## 2024-04-23 DIAGNOSIS — D50.0 IRON DEFICIENCY ANEMIA DUE TO CHRONIC BLOOD LOSS: ICD-10-CM

## 2024-04-23 PROCEDURE — 43239 EGD BIOPSY SINGLE/MULTIPLE: CPT | Performed by: STUDENT IN AN ORGANIZED HEALTH CARE EDUCATION/TRAINING PROGRAM

## 2024-04-23 PROCEDURE — 88305 TISSUE EXAM BY PATHOLOGIST: CPT | Mod: TC,59,ELYLAB | Performed by: STUDENT IN AN ORGANIZED HEALTH CARE EDUCATION/TRAINING PROGRAM

## 2024-04-23 PROCEDURE — 45378 DIAGNOSTIC COLONOSCOPY: CPT | Performed by: STUDENT IN AN ORGANIZED HEALTH CARE EDUCATION/TRAINING PROGRAM

## 2024-04-23 PROCEDURE — 88305 TISSUE EXAM BY PATHOLOGIST: CPT | Performed by: PATHOLOGY

## 2024-04-23 PROCEDURE — 43251 EGD REMOVE LESION SNARE: CPT | Performed by: STUDENT IN AN ORGANIZED HEALTH CARE EDUCATION/TRAINING PROGRAM

## 2024-04-23 RX ORDER — PROPOFOL 10 MG/ML
INJECTION, EMULSION INTRAVENOUS AS NEEDED
Status: DISCONTINUED | OUTPATIENT
Start: 2024-04-23 | End: 2024-04-23

## 2024-04-23 RX ORDER — ONDANSETRON HYDROCHLORIDE 2 MG/ML
4 INJECTION, SOLUTION INTRAVENOUS ONCE AS NEEDED
Status: DISCONTINUED | OUTPATIENT
Start: 2024-04-23 | End: 2024-04-25 | Stop reason: HOSPADM

## 2024-04-23 RX ORDER — SODIUM CHLORIDE, SODIUM LACTATE, POTASSIUM CHLORIDE, CALCIUM CHLORIDE 600; 310; 30; 20 MG/100ML; MG/100ML; MG/100ML; MG/100ML
20 INJECTION, SOLUTION INTRAVENOUS CONTINUOUS
Status: DISCONTINUED | OUTPATIENT
Start: 2024-04-23 | End: 2024-04-25 | Stop reason: HOSPADM

## 2024-04-23 RX ORDER — LIDOCAINE HYDROCHLORIDE 20 MG/ML
INJECTION, SOLUTION INFILTRATION; PERINEURAL AS NEEDED
Status: DISCONTINUED | OUTPATIENT
Start: 2024-04-23 | End: 2024-04-23

## 2024-04-23 RX ORDER — SODIUM CHLORIDE 9 MG/ML
INJECTION, SOLUTION INTRAVENOUS CONTINUOUS PRN
Status: DISCONTINUED | OUTPATIENT
Start: 2024-04-23 | End: 2024-04-23

## 2024-04-23 RX ADMIN — PROPOFOL 50 MG: 10 INJECTION, EMULSION INTRAVENOUS at 12:23

## 2024-04-23 RX ADMIN — PROPOFOL 50 MG: 10 INJECTION, EMULSION INTRAVENOUS at 12:32

## 2024-04-23 RX ADMIN — LIDOCAINE HYDROCHLORIDE 100 MG: 20 INJECTION, SOLUTION INFILTRATION; PERINEURAL at 12:21

## 2024-04-23 RX ADMIN — PROPOFOL 50 MG: 10 INJECTION, EMULSION INTRAVENOUS at 12:29

## 2024-04-23 RX ADMIN — PROPOFOL 50 MG: 10 INJECTION, EMULSION INTRAVENOUS at 12:45

## 2024-04-23 RX ADMIN — PROPOFOL 50 MG: 10 INJECTION, EMULSION INTRAVENOUS at 12:39

## 2024-04-23 RX ADMIN — PROPOFOL 50 MG: 10 INJECTION, EMULSION INTRAVENOUS at 12:27

## 2024-04-23 RX ADMIN — PROPOFOL 50 MG: 10 INJECTION, EMULSION INTRAVENOUS at 12:48

## 2024-04-23 RX ADMIN — PROPOFOL 50 MG: 10 INJECTION, EMULSION INTRAVENOUS at 12:35

## 2024-04-23 RX ADMIN — PROPOFOL 150 MG: 10 INJECTION, EMULSION INTRAVENOUS at 12:21

## 2024-04-23 RX ADMIN — PROPOFOL 50 MG: 10 INJECTION, EMULSION INTRAVENOUS at 12:25

## 2024-04-23 RX ADMIN — PROPOFOL 50 MG: 10 INJECTION, EMULSION INTRAVENOUS at 12:42

## 2024-04-23 RX ADMIN — SODIUM CHLORIDE: 9 INJECTION, SOLUTION INTRAVENOUS at 12:16

## 2024-04-23 SDOH — HEALTH STABILITY: MENTAL HEALTH: CURRENT SMOKER: 0

## 2024-04-23 ASSESSMENT — PAIN - FUNCTIONAL ASSESSMENT
PAIN_FUNCTIONAL_ASSESSMENT: 0-10

## 2024-04-23 ASSESSMENT — PAIN SCALES - GENERAL
PAINLEVEL_OUTOF10: 0 - NO PAIN
PAIN_LEVEL: 0
PAINLEVEL_OUTOF10: 0 - NO PAIN

## 2024-04-23 ASSESSMENT — COLUMBIA-SUICIDE SEVERITY RATING SCALE - C-SSRS
2. HAVE YOU ACTUALLY HAD ANY THOUGHTS OF KILLING YOURSELF?: NO
6. HAVE YOU EVER DONE ANYTHING, STARTED TO DO ANYTHING, OR PREPARED TO DO ANYTHING TO END YOUR LIFE?: NO
1. IN THE PAST MONTH, HAVE YOU WISHED YOU WERE DEAD OR WISHED YOU COULD GO TO SLEEP AND NOT WAKE UP?: NO

## 2024-04-23 NOTE — ANESTHESIA POSTPROCEDURE EVALUATION
Patient: Keenan Wharton    Procedure Summary       Date: 04/23/24 Room / Location: [facility]    Anesthesia Start: 1216 Anesthesia Stop:     Procedures:       COLONOSCOPY      EGD Diagnosis: Iron deficiency anemia due to chronic blood loss    Scheduled Providers: Massiel Godwin MD; CLAIRE Cash Responsible Provider: CLAIRE Cash    Anesthesia Type: MAC ASA Status: 2            Anesthesia Type: MAC    Vitals Value Taken Time   BP 96/53 04/23/24 1254   Temp 36.7 04/23/24 1254   Pulse 80 04/23/24 1254   Resp 13 04/23/24 1254   SpO2 96 04/23/24 1254       Anesthesia Post Evaluation    Patient location during evaluation: bedside  Patient participation: complete - patient participated  Level of consciousness: awake  Pain score: 0  Pain management: adequate  Airway patency: patent  Cardiovascular status: acceptable  Respiratory status: acceptable and room air  Hydration status: acceptable  Postoperative Nausea and Vomiting: none      No notable events documented.

## 2024-04-23 NOTE — DISCHARGE INSTRUCTIONS
Patient Instructions Post Procedure      The anesthetics, sedatives or narcotics which were given to you today will be acting in your body for the next 24 hours, so you might feel a little sleepy or groggy.  This feeling should slowly wear off. Carefully read and follow the instructions.     You received sedation today:  - Do not drive or operate any machinery or power tools of any kind.   - No alcoholic beverages today, not even beer or wine.  - Do not make any important decisions or sign any legal documents.  - No over the counter medications that contain alcohol or that may cause drowsiness.    While it is common to experience mild to moderate abdominal distention, gas, or belching after your procedure, if any of these symptoms occur following discharge from the GI Lab or within one week of having your procedure, call the [facility] to be advised whether a visit to your nearest Urgent Care or Emergency Department is indicated.  Take this paper with you if you go.   - If you develop an allergic reaction to the medications that were given during your procedure such as difficulty breathing, rash, hives, severe nausea, vomiting or lightheadedness.  - If you experience chest pain, shortness of breath, severe abdominal pain, fevers and chills.  -If you develop signs and symptoms of bleeding such as blood in your spit, if your stools turn black, tarry, or bloody  - If you have not urinated within 8 hours following your procedure.  - If your IV site becomes painful, red, inflamed, or looks infected.    If you received a biopsy/polypectomy/sphincterotomy the following instructions apply below:  __ Do not use Aspirin containing products, non-steroidal medications or anti-coagulants for one week following your procedure. (Examples of these types of medications are: Advil, Arthrotec, Aleve, Coumadin, Ecotrin, Heparin, Ibuprofen, Indocin, Motrin, Naprosyn, Nuprin, Plavix, Vioxx, and Voltarin, or their generic  forms.  This list is not all-inclusive.  Check with your physician or pharmacist before resuming medications.)   __ Eat a soft diet today.  Avoid foods that are poorly digested for the next 24 hours.  These foods would include: nuts, beans, lettuce, red meats, and fried foods. Start with liquids and advance your diet as tolerated, gradually work up to eating solids.   __ Do not have a Barium Study or Enema for one week.    Your physician recommends the additional following instructions:    -You have a contact number available for emergencies. The signs and symptoms of potential delayed complications were discussed with you. You may return to normal activities tomorrow.  -Resume your previous diet or other if specified.  -Continue your present medications.   -We are waiting for your pathology results, if applicable.  -The findings and recommendations have been discussed with you and/or family.  - Please see Medication Reconciliation Form for new medication/medications prescribed.     If you experience any problems or have any questions following discharge from the GI Lab, please call: [phone] from 7 am- 4:30 pm.  In the event of an emergency please go to the closest Emergency Department or call  At [phone]

## 2024-04-23 NOTE — H&P
Procedure H&P    Patient Profile-Procedures  Name Keenan Wharton  Date of Birth 1965  MRN [id number]  Address   [address] [id number] [address] [address]    Primary Phone Number [phone]  Secondary Phone Number    JALEN MartinezchanKeenan    Procedure(s):  Procedures: EGD and Colonoscopy  Primary contact name and number   Extended Emergency Contact Information  Primary Emergency Contact: Caitlin Martin  Mobile Phone: [phone]  Relation: Spouse    General Health  Weight There were no vitals filed for this visit.  BMI There is no height or weight on file to calculate BMI.    Allergies  Allergies   Allergen Reactions    Lactose Nausea/vomiting     Lactose intolerant       Past Medical History   Past Medical History:   Diagnosis Date    GERD (gastroesophageal reflux disease)        Provider assessment  Diagnosis:  anemia  Medication Reviewed - yes  Prior to Admission medications    Medication Sig Start Date End Date Taking? Authorizing Provider   cyanocobalamin (Vitamin B-12) 250 mcg tablet Take 1 tablet (250 mcg) by mouth once daily.   Yes Historical Provider, MD   ferrous sulfate 325 (65 Fe) MG EC tablet Take 65 mg by mouth once daily with a meal. Do not crush, chew, or split.   Yes Historical Provider, MD   pantoprazole (ProtoNix) 40 mg EC tablet TAKE 1 TABLET BY MOUTH DAILY 1/17/24  Yes Keenan Easley, DO   sodium,potassium,mag sulfates (Suprep) 17.5-3.13-1.6 gram recon soln solution Take 2 bottles by mouth see administration instructions. 3/20/24  Yes Massiel Godwin MD       Physical Exam  There were no vitals filed for this visit.     General: A&Ox3, NAD.  HEENT: AT/NC.   CV: RRR. No murmur.  Resp: CTA bilaterally. No wheezing, rhonchi or rales.   GI: Soft, NT/ND. BSx4.  Extrem: No edema. Pulses intact.  Neuro: No focal deficits.   Psych: Normal mood and affect.      Procedure Plan - pre-procedural (re)assesment completed by physician:  discharge/transfer patient when  discharge criteria met    Massiel Godwin MD  4/23/2024 11:29 AM

## 2024-04-23 NOTE — ANESTHESIA PREPROCEDURE EVALUATION
Patient: Keenan Wharton    Procedure Information       Date/Time: 04/23/24 1140    Scheduled providers: Massiel Godwin MD; CURT Cash-CRNA    Procedures:       COLONOSCOPY      EGD    Location: [facility]            Relevant Problems   Pulmonary   (+) Emphysema of lung (Multi)      GI   (+) Gastroesophageal reflux disease without esophagitis      Hematology   (+) Anemia       Clinical information reviewed:   Tobacco  Allergies  Meds   Med Hx  Surg Hx   Fam Hx  Soc Hx        NPO Detail:  NPO/Void Status  Date of Last Liquid: 04/23/24  Time of Last Liquid: 0700  Date of Last Solid: 04/21/24  Last Intake Type: Clear fluids         Physical Exam    Airway  Mallampati: II  TM distance: >3 FB  Neck ROM: full     Cardiovascular - normal exam     Dental    Pulmonary - normal exam     Abdominal - normal exam           Anesthesia Plan    History of general anesthesia?: yes  History of complications of general anesthesia?: no    ASA 2     MAC   (Preoxygenated 2L prior to procedure.  Patient positioned self to comfort prior to sedation administered; eyes closed; continuous monitoring)  The patient is not a current smoker.    intravenous induction   Anesthetic plan and risks discussed with patient.    Plan discussed with CRNA.

## 2024-04-30 LAB
LABORATORY COMMENT REPORT: NORMAL
PATH REPORT.FINAL DX SPEC: NORMAL
PATH REPORT.GROSS SPEC: NORMAL
PATH REPORT.TOTAL CANCER: NORMAL

## 2024-05-15 ENCOUNTER — OFFICE VISIT (OUTPATIENT)
Dept: GASTROENTEROLOGY | Facility: CLINIC | Age: 59
End: 2024-05-15
Payer: COMMERCIAL

## 2024-05-15 VITALS
BODY MASS INDEX: 21.66 KG/M2 | WEIGHT: 138 LBS | HEART RATE: 48 BPM | SYSTOLIC BLOOD PRESSURE: 114 MMHG | DIASTOLIC BLOOD PRESSURE: 76 MMHG | HEIGHT: 67 IN

## 2024-05-15 DIAGNOSIS — K64.8 OTHER HEMORRHOIDS: ICD-10-CM

## 2024-05-15 DIAGNOSIS — K22.70 BARRETT'S ESOPHAGUS WITHOUT DYSPLASIA: ICD-10-CM

## 2024-05-15 DIAGNOSIS — D50.0 IRON DEFICIENCY ANEMIA DUE TO CHRONIC BLOOD LOSS: Primary | ICD-10-CM

## 2024-05-15 PROCEDURE — 99215 OFFICE O/P EST HI 40 MIN: CPT | Performed by: STUDENT IN AN ORGANIZED HEALTH CARE EDUCATION/TRAINING PROGRAM

## 2024-05-15 NOTE — LETTER
May 15, 2024     Keenan ANGELITO Dio    Patient: Keenan Wharton   YOB: 1965   Date of Visit: 5/15/2024       Dear Keenan Wharton:    Below are my recommendations for your visit today.    Start one tablespoon of Benefiber or Citrucell daily (fiber supplement)  Use squatty potty to avoid straining  Establish care with colorectal surgery  Obtain CT enterography to evaluate small bowel  Return to clinic in one year, my clinic will call you to schedule this closer to that date    If you have questions, please do not hesitate to call me.        Sincerely,     Massiel Godwin MD

## 2024-05-15 NOTE — PROGRESS NOTES
Subjective     History of Present Illness:   Keenan Wharton is a 58 y.o. male with hx of emphysema, Raynaud disease and chronic NATIVIDAD dating back to 2021 who presents to clinic for evaluation of worsening hematochezia. He completed EGD +colonoscopy that showed short segement Marie's esohpagus. Also with large hemorrhoids.Biopsies otherwise negative for Hpylori or autoimmune gastritis. TI normal.     He is feeling improved since his colonoscopy. He feels like the bowel cleanse helped his bleeding as he has only had a handful of episodes in the past few months compared with daily symptoms previously.       With regards to his prior visits,  Patient states he has been on iron supplementation for the past year however his ferritin most recently was measured at 21.  His most concerning symptom at this time is that he has been having ongoing hematochezia at times filling out the toilet bowl.  The hematochezia is intermittent, he has not had any episodes of blood in his stool this past week.  He has no abdominal pain, weight loss.  He has no melena.  He has no nausea or vomiting.    He completed upper endoscopy and colonoscopy in 2020 with concern for BE (C1M1) but biopsies were negative for intestinal metaplasia. Colonoscopy showed internal hemorrhoids. He has no family hx of GI malignancy      Past Medical History   has a past medical history of GERD (gastroesophageal reflux disease).     Social History   reports that he has never smoked. He has never used smokeless tobacco. He reports current alcohol use. He reports that he does not currently use drugs.     Family History  family history is not on file.     Allergies  Allergies   Allergen Reactions    Lactose Nausea/vomiting     Lactose intolerant       Medications  Current Outpatient Medications   Medication Instructions    cyanocobalamin (VITAMIN B-12) 250 mcg, oral, Daily    ferrous sulfate 65 mg, oral, Daily with breakfast, Do not crush, chew, or split.     pantoprazole (ProtoNix) 40 mg EC tablet TAKE 1 TABLET BY MOUTH DAILY        Objective   Visit Vitals  /76   Pulse (!) 48      Physical Exam  Vitals reviewed.   Constitutional:       Appearance: Normal appearance.   HENT:      Head: Normocephalic.      Mouth/Throat:      Mouth: Mucous membranes are moist.   Cardiovascular:      Rate and Rhythm: Normal rate and regular rhythm.   Pulmonary:      Effort: Pulmonary effort is normal.      Breath sounds: Normal breath sounds.   Abdominal:      General: Abdomen is flat.   Neurological:      General: No focal deficit present.      Mental Status: He is alert.   Psychiatric:         Mood and Affect: Mood normal.         Judgment: Judgment normal.         Assessment/Plan   Keenan Wharton is a 58 y.o. male with hx of emphysema, Raynaud disease and chronic NATIVIDAD dating back to 2021 who presents to clinic for evaluation of worsening hematochezia and ongoing NATIVIDAD despite iron supplementation. Will refer to colorectal surgery for hemorrhoidal evaluation/management given bidirectional endoscopy with no intraluminal source. Biopsies negative for Hpylori and autoimmune gastritis. Will also obtain CTE to evaluate small bowel for other sources of occult blood loss. Overall suspicion remains highest for hemorrhoidal source as he was experiencing daily episodes of BRBPR prior to endoscopy.       Finally will plan to add fiber supplementation daily. 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 He will continue PPI daily, 30 minutes before breakfast.     Problem List Items Addressed This Visit       Anemia - Primary    Relevant Orders    CT enterography w contrast    Referral to Colorectal Surgery     Other Visit Diagnoses       Marie's esophagus without dysplasia        Other hemorrhoids                         Massiel Godwin MD         My final recommendations will be communicated back to the requesting physician by way of shared Medical record or letter to  requesting physician via fax.

## 2024-05-15 NOTE — PATIENT INSTRUCTIONS
Start one tablespoon of Benefiber or Citrucell daily  Use squatty potty to avoid straining  Establish care with colorectal surgery  Obtain CT enterography to evaluate small bowel  RTC in one year

## 2024-05-28 ENCOUNTER — LAB (OUTPATIENT)
Dept: LAB | Facility: LAB | Age: 59
End: 2024-05-28
Payer: COMMERCIAL

## 2024-05-28 DIAGNOSIS — Z82.49 FAMILY HISTORY OF EARLY CAD: ICD-10-CM

## 2024-05-28 DIAGNOSIS — E78.49 OTHER HYPERLIPIDEMIA: ICD-10-CM

## 2024-05-28 DIAGNOSIS — D64.9 ANEMIA, UNSPECIFIED TYPE: ICD-10-CM

## 2024-05-28 LAB
BASOPHILS # BLD AUTO: 0.03 X10*3/UL (ref 0–0.1)
BASOPHILS NFR BLD AUTO: 0.8 %
EOSINOPHIL # BLD AUTO: 0.09 X10*3/UL (ref 0–0.7)
EOSINOPHIL NFR BLD AUTO: 2.3 %
ERYTHROCYTE [DISTWIDTH] IN BLOOD BY AUTOMATED COUNT: 12.1 % (ref 11.5–14.5)
FERRITIN SERPL-MCNC: 25 NG/ML (ref 20–300)
HCT VFR BLD AUTO: 38.5 % (ref 41–52)
HGB BLD-MCNC: 13.4 G/DL (ref 13.5–17.5)
IMM GRANULOCYTES # BLD AUTO: 0.01 X10*3/UL (ref 0–0.7)
IMM GRANULOCYTES NFR BLD AUTO: 0.3 % (ref 0–0.9)
IRON SATN MFR SERPL: 23 % (ref 25–45)
IRON SERPL-MCNC: 102 UG/DL (ref 35–150)
LYMPHOCYTES # BLD AUTO: 1 X10*3/UL (ref 1.2–4.8)
LYMPHOCYTES NFR BLD AUTO: 25 %
MCH RBC QN AUTO: 31.3 PG (ref 26–34)
MCHC RBC AUTO-ENTMCNC: 34.8 G/DL (ref 32–36)
MCV RBC AUTO: 90 FL (ref 80–100)
MONOCYTES # BLD AUTO: 0.32 X10*3/UL (ref 0.1–1)
MONOCYTES NFR BLD AUTO: 8 %
NEUTROPHILS # BLD AUTO: 2.55 X10*3/UL (ref 1.2–7.7)
NEUTROPHILS NFR BLD AUTO: 63.6 %
NRBC BLD-RTO: 0 /100 WBCS (ref 0–0)
PLATELET # BLD AUTO: 251 X10*3/UL (ref 150–450)
RBC # BLD AUTO: 4.28 X10*6/UL (ref 4.5–5.9)
TIBC SERPL-MCNC: 452 UG/DL (ref 240–445)
UIBC SERPL-MCNC: 350 UG/DL (ref 110–370)
VIT B12 SERPL-MCNC: 654 PG/ML (ref 211–911)
WBC # BLD AUTO: 4 X10*3/UL (ref 4.4–11.3)

## 2024-05-28 PROCEDURE — 82607 VITAMIN B-12: CPT

## 2024-05-28 PROCEDURE — 85025 COMPLETE CBC W/AUTO DIFF WBC: CPT

## 2024-05-28 PROCEDURE — 36415 COLL VENOUS BLD VENIPUNCTURE: CPT

## 2024-05-28 PROCEDURE — 82172 ASSAY OF APOLIPOPROTEIN: CPT

## 2024-05-28 PROCEDURE — 83540 ASSAY OF IRON: CPT

## 2024-05-28 PROCEDURE — 83550 IRON BINDING TEST: CPT

## 2024-05-28 PROCEDURE — 82728 ASSAY OF FERRITIN: CPT

## 2024-05-30 ENCOUNTER — HOSPITAL ENCOUNTER (OUTPATIENT)
Dept: RADIOLOGY | Facility: HOSPITAL | Age: 59
Discharge: HOME | End: 2024-05-30
Payer: COMMERCIAL

## 2024-05-30 DIAGNOSIS — D50.0 IRON DEFICIENCY ANEMIA DUE TO CHRONIC BLOOD LOSS: ICD-10-CM

## 2024-05-30 LAB
CREAT SERPL-MCNC: 1.1 MG/DL (ref 0.6–1.3)
GFR SERPL CREATININE-BSD FRML MDRD: 77 ML/MIN/1.73M*2
LPA SERPL-MCNC: 11 MG/DL

## 2024-05-30 PROCEDURE — 74177 CT ABD & PELVIS W/CONTRAST: CPT

## 2024-05-30 PROCEDURE — 2550000001 HC RX 255 CONTRASTS: Performed by: STUDENT IN AN ORGANIZED HEALTH CARE EDUCATION/TRAINING PROGRAM

## 2024-05-30 PROCEDURE — 74177 CT ABD & PELVIS W/CONTRAST: CPT | Performed by: RADIOLOGY

## 2024-05-30 PROCEDURE — 82565 ASSAY OF CREATININE: CPT

## 2024-05-30 RX ADMIN — IOHEXOL 75 ML: 350 INJECTION, SOLUTION INTRAVENOUS at 17:36

## 2024-05-31 ENCOUNTER — APPOINTMENT (OUTPATIENT)
Dept: LAB | Facility: CLINIC | Age: 59
End: 2024-05-31
Payer: COMMERCIAL

## 2024-06-02 ASSESSMENT — ENCOUNTER SYMPTOMS
HEMATOLOGIC/LYMPHATIC NEGATIVE: 1
APPETITE CHANGE: 0
SPEECH DIFFICULTY: 0
FREQUENCY: 0
DIZZINESS: 0
DECREASED CONCENTRATION: 0
ABDOMINAL PAIN: 0
ABDOMINAL DISTENTION: 0
CHILLS: 0
NAUSEA: 0
CONSTIPATION: 0
HEADACHES: 0
NUMBNESS: 0
HEMATURIA: 0
NECK STIFFNESS: 0
RESPIRATORY NEGATIVE: 1
CONSTITUTIONAL NEGATIVE: 1
EYE PROBLEMS: 0
DIAPHORESIS: 0
CONFUSION: 0
DIFFICULTY URINATING: 0
NECK PAIN: 0
WOUND: 0
VOMITING: 0
SCLERAL ICTERUS: 0
DIARRHEA: 0
CARDIOVASCULAR NEGATIVE: 1

## 2024-06-03 ENCOUNTER — TELEMEDICINE (OUTPATIENT)
Dept: HEMATOLOGY/ONCOLOGY | Facility: CLINIC | Age: 59
End: 2024-06-03
Payer: COMMERCIAL

## 2024-06-03 DIAGNOSIS — D64.9 ANEMIA, UNSPECIFIED TYPE: Primary | ICD-10-CM

## 2024-06-03 DIAGNOSIS — D50.8 OTHER IRON DEFICIENCY ANEMIA: ICD-10-CM

## 2024-06-03 PROCEDURE — 99214 OFFICE O/P EST MOD 30 MIN: CPT

## 2024-06-03 PROCEDURE — 99214 OFFICE O/P EST MOD 30 MIN: CPT | Mod: 95

## 2024-06-03 NOTE — PROGRESS NOTES
"Patient ID: Keenan Wharton is a 58 y.o. male.  Primary care physician:     Interval History:   9/28/2021: Initial visit   -h/o progressive normocytic anemia, starting July 2020. On 9/20/21  WBC 3.4, Hgb 10.2, Hct 32.8, MCV 84, Plt 226; iron 43, TIBC >493, sat NC, ferritin 12  -has been on a \"blood builder\"  supplement which contains iron for about 3 months.  -has noticed mild increase in fatigue, but especially less exercise tolerance with some HELMS. He is an active runner and has had to cut back. Possible restless legs at night.  -denies indigestion/reflux but is on protonix to control this. Denies n/v, c/d, melena, hematochezia. Occ BRBPR attributed to hemorrhoids noted on colonoscopy 9/2020. denies hematuria  -eats a regular diet, no gluten intolerance, weight stable.  -denies HELMS, chest pain, abd pain/distention.  -9/2020  colonoscopy/EGD: +hemorrhoids, otherwise unremarkable per patient    Selected labs prior to the initial consult:   11/11/2008  WBC 4.0, Hgb 12.3, Hct 38.1, Plt 212; iron 57, TIBC 459, sat 12%  6/14/2011 WBC 4.7, Hgb 13.3, Hct 40, Plt 203; iron 50, TIBC 363, sat 14%, ferr 30  10/22/2015  wBC 4.3, ANC 2.57, ALC 1.19, Hgb 13.4, Hct 40.8, Plt 189  1/26/2018  WBC 4.5, ANC 2.6, Hgb 13, Hct 39.5, Plt 210  12/3/2018  WBC 4.1, ANC 2.27, Hgb 13.1, Hct 39.7, MCV 94, Plt 198  9/27/2019  wBC 4.0, ANC 2.46, Hgb 13.3, Hct 41.1, Plt 206  7/2/2020  wBC 3.6, ANC 1.87, ALC 1.17, Hgb 12.2, Hct 37.2, MCV 93, Plt 194; tSH 1.25, PSA 1.48  8/13/2021  WBC 3.6, ANC 2.22, ALC 0.7, Hgb 10.4, Hct 33.8, MCV 86, Plt 224; Hep C neg; creat 0.96, Ca+ 9.3, t pro 7.2, t bili 0.4, ast 17, alt 10, alkp 43  9/20/2021  wBC 3.4, Hgb 10.2, Hct 32.8, MCV 84, Plt 226; retic 1.1; Vit B12 352, folate 21.3, iron 43, TIBC >493, sat NC, ferr 12    Sep, 2021: Advised patient to start on oral iron once per day.     8/1/22: , SPEP with IF normal,     Subjective    11/30/24:  -Returned for follow up for anemia. Patient lost follow up since " March, 2022  -Clinically doing well today. Admits reasonable energy level. States that he is no longer feeling sluggish.   -States that he is taking blood builder and multi-vitamins daily.     3/25/2024:   - Patient presents for follow-up visit, his previous provider relocated therefore I have taken over his care, this is our first encounter together  - Patient reports that he saw GI last week due to ongoing hematochezia, they recommended a colonoscopy and EGD  - Patient states that he has not had any hematochezia in the last 2 weeks  - Patient reports he has a family history of esophageal cancer, he does have reflux but says it is controlled and denies dysphagia  - Patient reports that he has been feeling well and that his energy is good  - He states that he has no complaints or symptoms going on, and denies any changes or hospital stays since last visit  - Patient denies shortness of breath, chest pain, heart palpitations, melena, nausea, vomiting, constipation, diarrhea, fevers, chills, new lumps or bumps, edema or concerns  - Patient is on Protonix 40 mg daily, iron supplementation tablet with vitamin C daily, and vitamin B12 supplemental tablet once daily      6/3/2024: Consent:  A telephone visit (audio only) between the patient at home and the provider at [facility] was utilized to provide this telehealth service.  - Patient presents for follow up   - He reports his hemorrhoidal bleeding has resolved, last episode was about a month ago and before that was 3 weeks before   - He is now on a fiber supplement daily   - He reports he had an EGD, colonoscopy and CT enterography; he reports he was notified no abnormalities   - He feels more energy and denies complaints/concerns   - Although his hemorrhoidal bleeding has improved he is still seeing colorectal surgery this week       Social History     Tobacco Use    Smoking status: Never    Smokeless tobacco: Never   Vaping Use    Vaping  status: Never Used   Substance Use Topics    Alcohol use: Yes     Comment: 10-12 drinks a week    Drug use: Not Currently        Objective    Visit Vitals  Smoking Status Never      Vital Signs: N/A due to telephone encounter      Labs:  Lab Results   Component Value Date    WBC 4.0 (L) 05/28/2024    NEUTROABS 2.55 05/28/2024    IGABSOL 0.01 05/28/2024    LYMPHSABS 1.00 (L) 05/28/2024    MONOSABS 0.32 05/28/2024    EOSABS 0.09 05/28/2024    BASOSABS 0.03 05/28/2024    RBC 4.28 (L) 05/28/2024    MCV 90 05/28/2024    MCHC 34.8 05/28/2024    HGB 13.4 (L) 05/28/2024    HCT 38.5 (L) 05/28/2024     05/28/2024     Lab Results   Component Value Date    CREATININE 0.86 10/30/2023    BUN 9 10/30/2023    EGFR 77 05/30/2024     10/30/2023    K 4.0 10/30/2023     10/30/2023    CO2 30 10/30/2023      Lab Results   Component Value Date    ALT 11 10/30/2023    AST 15 10/30/2023    ALKPHOS 45 10/30/2023    BILITOT 0.7 10/30/2023      Lab Results   Component Value Date    IRON 102 05/28/2024    TIBC 452 (H) 05/28/2024    FERRITIN 25 05/28/2024      Lab Results   Component Value Date    AFALPOAM37 654 05/28/2024      Lab Results   Component Value Date    FOLATE 17.4 10/30/2023       Review of Systems   Constitutional: Negative.  Negative for appetite change, chills and diaphoresis.   HENT:   Negative for lump/mass, mouth sores and nosebleeds.    Eyes:  Negative for eye problems and icterus.   Respiratory: Negative.     Cardiovascular: Negative.    Gastrointestinal:  Negative for abdominal distention, abdominal pain, constipation, diarrhea, nausea and vomiting.   Genitourinary:  Negative for bladder incontinence, difficulty urinating, frequency and hematuria.    Musculoskeletal:  Negative for gait problem, neck pain and neck stiffness.   Skin:  Negative for itching, rash and wound.   Neurological:  Negative for dizziness, gait problem, headaches, numbness and speech difficulty.   Hematological: Negative.   "  Psychiatric/Behavioral:  Negative for confusion and decreased concentration.         Physical Exam: N/A due to telephone encounter         EGD 4/23/2024:  Findings  [place]-colored mucosa with 1 tongue measuring 20 mm in the esophagus; performed cold forceps biopsy to rule out Marie's esophagus;  3 cm hiatal hernia - GE junction 40 cm from the incisors, diaphragmatic impression 43 cm from the incisors  Multiple semi-pedunculated fundic gland polyps measuring 5-9 mm; performed cold snare with complete en bloc removal and retrieved specimen  The stomach appeared normal. Performed random biopsy using biopsy forceps to rule out H. pylori.  The duodenum appeared normal.    Colonoscopy 4/23/2024:  Findings  The terminal ileum appeared normal.;  The cecum, ascending colon, transverse colon, sigmoid colon and rectum appeared normal.;  Internal large hemorrhoids    Pathology Results:   FINAL DIAGNOSIS  A. STOMACH ANTRUM BIOPSY:   -- Antral type gastric mucosa with chemical/reactive gastropathy.  -- Fundic type gastric mucosa with mild features suggestive of proton pump inhibitor effect.  -- Helicobacter pylori-like organisms are not identified.     B. STOMACH BODY/CORPUS POLYPECTOMY:   -- Fundic gland polyp (s).     C. ESOPHAGUS DISTAL BIOPSY:      -- Fragments of squamous, squamocolumnar, and columnar gastric mucosa with focal goblet cell metaplasia.  --No evidence of dysplasia.    Per Dr. Massiel Godwin \"No severe inflammation or infection in the stomach. Biopsies of esophagus show Marie's esophagus with no cancerous cells.\"     CT enterography w contrast 5/31/2024:  IMPRESSION:  1. No suspicious small bowel pathology identified.  2. No significant hepatosplenomegaly or lymphadenopathy identified in  the abdomen or pelvis.  3. Additional findings as above.      Performance Status:  Asymptomatic    Assessment/Plan      Anemia  -normocytic anemia since at least July 2020  -Labs done on 10/30/23: Hgb 12.8, MCV 92, " ferritin 39, iron saturation 26%, reticulocyte counts 1.5%, vitamin B12 285, folate 17.4, creatinine 0.86  -Previously plasma dyscrasia excluded.    -Currently, patient is taking blood builder (contains iron possibly) and multi-vitamins daily.   -Since his iron level and vitamin B12 are borderline low, advised patient to start on ferrous sulfate one tab per day and vitamin B12 1000 mcg once per day.   -I will see him back in 3 months to follow up with labs done a few days prior to the next vsiit.     3/25/2024:   - PMH emphysema, raynaud disease and chronic NATIVIDAD dating to 2021   - Saw GI 3/20/24 for worsening hematochezia   - Labs from 2/26/24 show: WBC 5.2, hgb 11.8, plt 231,000, lymphocytes 1080, vitamin B12 647, iron saturation 6%, ferritin 21  - Going to have EGD and colonoscopy in about a month   - On protonix 40 mg orally daily   - On ferrous sulfate and vitamin B12 daily   - Has not had rectal bleeding for the last 2 weeks  - Reports feeling well, energy is good, appetite is intact with no unintentional weight loss  - Patient's labs were done about 4 weeks ago, based on his improvement of bleeding and overall feeling well with stable VSS he prefers to hold off on further lab work today   - Therefore, we will have him repeat labs 8 weeks from last set of labs (to equal 12 weeks apart) which would be about the end of May with a phone visit to review labs     6/3/2024:  - Patient presents for follow up   - Labs done 5/28/24: WBC 4.0, hgb 13.4, plt 251,000, iron saturation 23%, ferritin 25, vitamin B12 654  - His hgb has improved from 11.8 and iron saturation from 6%   - He is on oral iron every other day; I advised him to continue   - He will return for labs a FUV in 3 months to evaluate if he needs to continue his oral iron or not      2. GERD/Marie's Esophagus   -Currently on pantoprazole         Diagnoses and all orders for this visit:  Anemia, unspecified type  -     Clinic Appointment Request Other  (comment) (phone visit); ALLYSSA JARRELL  -     Clinic Appointment Request Follow Up (PHONE); ALLYSSA JARRELL (PHONE); (PHONE); Future  -     CBC and Auto Differential; Future  -     Iron and TIBC; Future  -     Vitamin B12; Future  -     Ferritin; Future  Other iron deficiency anemia  -     Clinic Appointment Request Follow Up (PHONE); ALLYSSA JARRELL (PHONE); (PHONE); Future  -     CBC and Auto Differential; Future  -     Iron and TIBC; Future  -     Vitamin B12; Future  -     Ferritin; Future             Allyssa Jarrell, APRN-CNP

## 2024-06-05 ENCOUNTER — OFFICE VISIT (OUTPATIENT)
Dept: SURGERY | Facility: CLINIC | Age: 59
End: 2024-06-05
Payer: COMMERCIAL

## 2024-06-05 VITALS
SYSTOLIC BLOOD PRESSURE: 107 MMHG | TEMPERATURE: 97.9 F | HEIGHT: 67 IN | HEART RATE: 57 BPM | DIASTOLIC BLOOD PRESSURE: 71 MMHG | WEIGHT: 147 LBS | BODY MASS INDEX: 23.07 KG/M2

## 2024-06-05 DIAGNOSIS — D50.0 IRON DEFICIENCY ANEMIA DUE TO CHRONIC BLOOD LOSS: ICD-10-CM

## 2024-06-05 DIAGNOSIS — K64.8 INTERNAL HEMORRHOIDS: Primary | ICD-10-CM

## 2024-06-05 PROCEDURE — 99203 OFFICE O/P NEW LOW 30 MIN: CPT | Performed by: NURSE PRACTITIONER

## 2024-06-05 PROCEDURE — 99213 OFFICE O/P EST LOW 20 MIN: CPT | Performed by: NURSE PRACTITIONER

## 2024-06-05 PROCEDURE — 46600 DIAGNOSTIC ANOSCOPY SPX: CPT | Performed by: NURSE PRACTITIONER

## 2024-06-05 ASSESSMENT — ENCOUNTER SYMPTOMS: RECTAL BLEEDING: 1

## 2024-06-05 NOTE — PROGRESS NOTES
Chief complaint:  NATIVIDAD and rectal bleeding    History Of Present Illness  Subjective   Keenan Wharton was referred by Shannanmitanisha  for evaluation of rectal bleeding and hemorrhoid    Dx with NATIVIDAD in 2021. He as been working with hematology, NATIVIDAD has improved to 13.4 on 5/28/24.    Pain: No  Protruding Tissue: Yes - rarely  Bleeding: Yes - was having bleeding every day until March   No weight loss    Bowel habits:  Moves bowels  1-2  Stool is  soft to normal  He  does not straining and spends 5 minutes on the toilet to have a BM    Dietary history:   Eats a high fiber diet yes  Drinks approximately 50-60 oz water daily  Exercise: 3-4 days a week  Fiber supplement: yes started in April 4/23/24  Colonoscopy  Impression  The terminal ileum appeared normal.  The cecum, ascending colon, transverse colon, sigmoid colon and rectum appeared normal.  Large hemorrhoids    5/31/24 CTE  IMPRESSION:  1. No suspicious small bowel pathology identified.  2. No significant hepatosplenomegaly or lymphadenopathy identified in  the abdomen or pelvis.    PMH: GERD, NATIVIDAD, Emphysema, raynauds. Optic mygraines  PSH: none  Family history: No CRC, UC, or Crohn's. Maternal grandfather with throat cancer  Tobacco use: no but grew up around smoke  Alcohol use: 10 drinks per week  Recreation drugs: Marijuania in the past and some edibles. THC vaping    Review of Systems    Constitutional: Negative for fever, chills, anorexia, weight loss, malaise   ENMT: Negative for nasal discharge, congestion, ear pain, mouth pain, throat pain  Respiratory: Negative for cough, hemoptysis, wheezing, shortness of breath. Emphysema  Cardiac: Negative for chest pain, dyspnea on exertion, orthopnea, palpitations, syncope  Gastrointestinal: Negative for nausea, vomiting, diarrhea, constipation, abdominal pain. +GERD  Genitourinary: Negative for discharge, dysuria, flank pain, frequency, hematuria  Musculoskeletal: Negative for decreased ROM, pain, swelling,  weakness   Neurological: Negative for dizziness, confusion, headache, seizures, syncope. +Optic migraines  Psychiatric: Negative for mood changes, anxiety, hallucinations, sleep changes, suicidal ideas  Skin: Negative for mass, pain, itching, rash, ulcer   Endocrine: Negative for heat intolerance, cold intolerance, excessive sweating, polyuria, excess thirst   Hematologic/Lymph: Negative for bruising, easy bleeding, night sweats, petechiae, history of DVT/PE or cancer. NATIVIDAD   Allergic/Immunologic: Negative for anaphylaxis, itchy/ teary eyes, itching, sneezing, swelling       Physical Exam  Constitutional: Well developed, awake/alert/oriented x3, no distress, alert and cooperative   Eyes: Sclera anicteric, no conjunctival inflammation, conjugate gaze   ENMT: mucous membranes moist, no apparent injury,   Head/Neck: Neck supple, no apparent injury, trachea midline, no bruits   Respiratory/Thorax: Patent airways, CTAB, normal breath sounds with good chest expansion  Cardiovascular: Regular, rate and rhythm, no murmurs, normal S1 and S2   Gastrointestinal: Nondistended, soft, non-tender, no rebound tenderness or guarding, no masses palpable, no organomegaly, +BS, no bruits   Extremities: normal extremities, no edema  Neurological: alert and oriented x3, normal strength, Normal gait   Psychological: Appropriate mood and behavior   Skin: Warm and dry, no lesions, no rashes   Anorectal: Normal tone, no external hemorrhoids or lesions, smooth mucosa    Patient ID: Keenan Wharton is a 58 y.o. male.    Anoscopy    Date/Time: 6/5/2024 9:18 AM    Performed by: ROSA Chaidez  Authorized by: ROSA Chaidez    Consent:     Consent obtained:  Verbal    Consent given by:  Patient    Risks, benefits, and alternatives were discussed: yes      Risks discussed:  Bleeding and pain  Universal protocol:     Procedure explained and questions answered to patient or proxy's satisfaction: yes      Relevant documents present  and verified: no      Patient identity confirmed:  Verbally with patient  Indications:     Indications: rectal bleeding      Indications comment:  NATIVIDAD  Procedure details:     Internal hemorrhoids: yes      Inflammation: yes    Post-procedure details:     Procedure completion:  Tolerated well, no immediate complications    A chaperone was present during the exam, SHA Ly.       Assessment:  H/o NATIVIDAD-improving  Large internal non-bleeding hemorrhoids    Plan:  -Increase fluid intake: aim for 8-10, 8 oz glasses of water daily. Other beverages, such as juice, coffee, or tea may count toward this goal.  -Increase high fiber foods: fruits , vegetables, whole grains, and beans. Aim for 25-35 grams per day.  -Add a powder fiber supplement daily. Like Metamucil, Citrucel, Konsyl or Benefiber daily-follow instructions on the bottle.  -Avoiding sitting on the toilet for prolonged periods of time  -Avoid straining  -Follow-up in 6 weeks for rubber band ligation    Cynthia Fofana, CURT-CNP

## 2024-06-05 NOTE — PATIENT INSTRUCTIONS
Large internal hemorrhoids    -Increase fluid intake: aim for 8-10, 8 oz glasses of water daily. Other beverages, such as juice, coffee, or tea may count toward this goal.  -Increase high fiber foods: fruits , vegetables, whole grains, and beans. Aim for 25-35 grams per day.  -Add a powder fiber supplement daily. Like Metamucil, Citrucel, Konsyl or Benefiber daily-follow instructions on the bottle.  -Avoiding sitting on the toilet for prolonged periods of time  -Avoid straining  -Follow-up in 6 weeks for rubber band ligation    It was a pleasure to see you in the office today.  If you have any questions or concerns, please let me know.    Sincerely,  Cynthia Fofana  Certified Nurse Practitioner-Colorectal surgery    [email or web address]  [phone] (Confidential voice mail)

## 2024-08-26 ENCOUNTER — TELEPHONE (OUTPATIENT)
Dept: HEMATOLOGY/ONCOLOGY | Facility: CLINIC | Age: 59
End: 2024-08-26
Payer: COMMERCIAL

## 2024-08-26 NOTE — TELEPHONE ENCOUNTER
I left a message for Keenan notifying them that his appt with Brittnee regino on 9/23/24 needs to be rescheduled at this time d/t brittnee being out of the office that day. I notified him that he can see his updated appt date/time in his [facility] zora and that an updated apt schedule will be mailed to him. I did notify him that if the new apt needs to be adjusted he can call the clinic. Call back information was left.

## 2024-09-09 ENCOUNTER — APPOINTMENT (OUTPATIENT)
Dept: HEMATOLOGY/ONCOLOGY | Facility: CLINIC | Age: 59
End: 2024-09-09
Payer: COMMERCIAL

## 2024-09-16 ENCOUNTER — APPOINTMENT (OUTPATIENT)
Dept: HEMATOLOGY/ONCOLOGY | Facility: CLINIC | Age: 59
End: 2024-09-16
Payer: COMMERCIAL

## 2024-09-23 ENCOUNTER — APPOINTMENT (OUTPATIENT)
Dept: HEMATOLOGY/ONCOLOGY | Facility: CLINIC | Age: 59
End: 2024-09-23
Payer: COMMERCIAL

## 2024-09-27 ENCOUNTER — LAB (OUTPATIENT)
Dept: LAB | Facility: LAB | Age: 59
End: 2024-09-27
Payer: COMMERCIAL

## 2024-09-27 DIAGNOSIS — D64.9 ANEMIA, UNSPECIFIED TYPE: ICD-10-CM

## 2024-09-27 DIAGNOSIS — D50.8 OTHER IRON DEFICIENCY ANEMIA: ICD-10-CM

## 2024-09-27 LAB
BASOPHILS # BLD AUTO: 0.03 X10*3/UL (ref 0–0.1)
BASOPHILS NFR BLD AUTO: 0.8 %
EOSINOPHIL # BLD AUTO: 0.08 X10*3/UL (ref 0–0.7)
EOSINOPHIL NFR BLD AUTO: 2 %
ERYTHROCYTE [DISTWIDTH] IN BLOOD BY AUTOMATED COUNT: 12.2 % (ref 11.5–14.5)
FERRITIN SERPL-MCNC: 31 NG/ML (ref 20–300)
HCT VFR BLD AUTO: 38.2 % (ref 41–52)
HGB BLD-MCNC: 13.1 G/DL (ref 13.5–17.5)
IMM GRANULOCYTES # BLD AUTO: 0.01 X10*3/UL (ref 0–0.7)
IMM GRANULOCYTES NFR BLD AUTO: 0.3 % (ref 0–0.9)
IRON SATN MFR SERPL: 26 % (ref 25–45)
IRON SERPL-MCNC: 108 UG/DL (ref 35–150)
LYMPHOCYTES # BLD AUTO: 1.18 X10*3/UL (ref 1.2–4.8)
LYMPHOCYTES NFR BLD AUTO: 29.9 %
MCH RBC QN AUTO: 30.8 PG (ref 26–34)
MCHC RBC AUTO-ENTMCNC: 34.3 G/DL (ref 32–36)
MCV RBC AUTO: 90 FL (ref 80–100)
MONOCYTES # BLD AUTO: 0.36 X10*3/UL (ref 0.1–1)
MONOCYTES NFR BLD AUTO: 9.1 %
NEUTROPHILS # BLD AUTO: 2.28 X10*3/UL (ref 1.2–7.7)
NEUTROPHILS NFR BLD AUTO: 57.9 %
NRBC BLD-RTO: 0 /100 WBCS (ref 0–0)
PLATELET # BLD AUTO: 243 X10*3/UL (ref 150–450)
RBC # BLD AUTO: 4.25 X10*6/UL (ref 4.5–5.9)
TIBC SERPL-MCNC: 414 UG/DL (ref 240–445)
UIBC SERPL-MCNC: 306 UG/DL (ref 110–370)
VIT B12 SERPL-MCNC: 951 PG/ML (ref 211–911)
WBC # BLD AUTO: 3.9 X10*3/UL (ref 4.4–11.3)

## 2024-09-27 PROCEDURE — 83550 IRON BINDING TEST: CPT

## 2024-09-27 PROCEDURE — 83540 ASSAY OF IRON: CPT

## 2024-09-27 PROCEDURE — 82728 ASSAY OF FERRITIN: CPT

## 2024-09-27 PROCEDURE — 85025 COMPLETE CBC W/AUTO DIFF WBC: CPT

## 2024-09-27 PROCEDURE — 82607 VITAMIN B-12: CPT

## 2024-09-27 PROCEDURE — 36415 COLL VENOUS BLD VENIPUNCTURE: CPT

## 2024-10-01 NOTE — PROGRESS NOTES
"Patient ID: Keenan Wharton is a 58 y.o. male.  Primary care physician:     Interval History:   9/28/2021: Initial visit   -h/o progressive normocytic anemia, starting July 2020. On 9/20/21  WBC 3.4, Hgb 10.2, Hct 32.8, MCV 84, Plt 226; iron 43, TIBC >493, sat NC, ferritin 12  -has been on a \"blood builder\"  supplement which contains iron for about 3 months.  -has noticed mild increase in fatigue, but especially less exercise tolerance with some HELMS. He is an active runner and has had to cut back. Possible restless legs at night.  -denies indigestion/reflux but is on protonix to control this. Denies n/v, c/d, melena, hematochezia. Occ BRBPR attributed to hemorrhoids noted on colonoscopy 9/2020. denies hematuria  -eats a regular diet, no gluten intolerance, weight stable.  -denies HELMS, chest pain, abd pain/distention.  -9/2020  colonoscopy/EGD: +hemorrhoids, otherwise unremarkable per patient    Selected labs prior to the initial consult:   11/11/2008  WBC 4.0, Hgb 12.3, Hct 38.1, Plt 212; iron 57, TIBC 459, sat 12%  6/14/2011 WBC 4.7, Hgb 13.3, Hct 40, Plt 203; iron 50, TIBC 363, sat 14%, ferr 30  10/22/2015  wBC 4.3, ANC 2.57, ALC 1.19, Hgb 13.4, Hct 40.8, Plt 189  1/26/2018  WBC 4.5, ANC 2.6, Hgb 13, Hct 39.5, Plt 210  12/3/2018  WBC 4.1, ANC 2.27, Hgb 13.1, Hct 39.7, MCV 94, Plt 198  9/27/2019  wBC 4.0, ANC 2.46, Hgb 13.3, Hct 41.1, Plt 206  7/2/2020  wBC 3.6, ANC 1.87, ALC 1.17, Hgb 12.2, Hct 37.2, MCV 93, Plt 194; tSH 1.25, PSA 1.48  8/13/2021  WBC 3.6, ANC 2.22, ALC 0.7, Hgb 10.4, Hct 33.8, MCV 86, Plt 224; Hep C neg; creat 0.96, Ca+ 9.3, t pro 7.2, t bili 0.4, ast 17, alt 10, alkp 43  9/20/2021  wBC 3.4, Hgb 10.2, Hct 32.8, MCV 84, Plt 226; retic 1.1; Vit B12 352, folate 21.3, iron 43, TIBC >493, sat NC, ferr 12    Sep, 2021: Advised patient to start on oral iron once per day.     8/1/22: , SPEP with IF normal,     11/30/24:  -Returned for follow up for anemia. Patient lost follow up since March, " 2022  -Clinically doing well today. Admits reasonable energy level. States that he is no longer feeling sluggish.   -States that he is taking blood builder and multi-vitamins daily.     3/25/2024:   - Patient presents for follow-up visit, his previous provider relocated therefore I have taken over his care, this is our first encounter together  - Patient reports that he saw GI last week due to ongoing hematochezia, they recommended a colonoscopy and EGD  - Patient states that he has not had any hematochezia in the last 2 weeks  - Patient reports he has a family history of esophageal cancer, he does have reflux but says it is controlled and denies dysphagia  - Patient reports that he has been feeling well and that his energy is good  - He states that he has no complaints or symptoms going on, and denies any changes or hospital stays since last visit  - Patient denies shortness of breath, chest pain, heart palpitations, melena, nausea, vomiting, constipation, diarrhea, fevers, chills, new lumps or bumps, edema or concerns  - Patient is on Protonix 40 mg daily, iron supplementation tablet with vitamin C daily, and vitamin B12 supplemental tablet once daily      6/3/2024: Consent:  A telephone visit (audio only) between the patient at home and the provider at [facility] at [place] was utilized to provide this telehealth service.  - Patient presents for follow up   - He reports his hemorrhoidal bleeding has resolved, last episode was about a month ago and before that was 3 weeks before   - He is now on a fiber supplement daily   - He reports he had an EGD, colonoscopy and CT enterography; he reports he was notified no abnormalities   - He feels more energy and denies complaints/concerns   - Although his hemorrhoidal bleeding has improved he is still seeing colorectal surgery this week       Subjective    10/2/2024: Consent:  A telephone visit (audio only) between the patient at home and the provider at  [facility] [place] was utilized to provide this telehealth service.    - Patient presents for follow up   - Denies complaints or concerns   - He reports feeling well, is taking oral iron every other day and tolerating well  - He says he is not longer having bright red blood per rectum and denies other signs of bleeding  - Denies B symptoms   - He reports no family history of blood cancers       Social History     Tobacco Use    Smoking status: Never    Smokeless tobacco: Never   Vaping Use    Vaping status: Never Used   Substance Use Topics    Alcohol use: Yes     Comment: 10-12 drinks a week    Drug use: Not Currently        Objective    Visit Vitals  Smoking Status Never      Vital Signs: N/A due to telephone encounter      Labs:  Lab Results   Component Value Date    WBC 3.9 (L) 09/27/2024    NEUTROABS 2.28 09/27/2024    IGABSOL 0.01 09/27/2024    LYMPHSABS 1.18 (L) 09/27/2024    MONOSABS 0.36 09/27/2024    EOSABS 0.08 09/27/2024    BASOSABS 0.03 09/27/2024    RBC 4.25 (L) 09/27/2024    MCV 90 09/27/2024    MCHC 34.3 09/27/2024    HGB 13.1 (L) 09/27/2024    HCT 38.2 (L) 09/27/2024     09/27/2024     Lab Results   Component Value Date    CREATININE 0.86 10/30/2023    BUN 9 10/30/2023    EGFR 77 05/30/2024     10/30/2023    K 4.0 10/30/2023     10/30/2023    CO2 30 10/30/2023      Lab Results   Component Value Date    ALT 11 10/30/2023    AST 15 10/30/2023    ALKPHOS 45 10/30/2023    BILITOT 0.7 10/30/2023      Lab Results   Component Value Date    IRON 108 09/27/2024    TIBC 414 09/27/2024    FERRITIN 31 09/27/2024      Lab Results   Component Value Date    ZUBUFUAX10 951 (H) 09/27/2024      Lab Results   Component Value Date    FOLATE 17.4 10/30/2023       Physical Exam: N/A due to telephone encounter     EGD 4/23/2024:  Findings  [place]-colored mucosa with 1 tongue measuring 20 mm in the esophagus; performed cold forceps biopsy to rule out Marie's esophagus;  3 cm hiatal  "hernia - GE junction 40 cm from the incisors, diaphragmatic impression 43 cm from the incisors  Multiple semi-pedunculated fundic gland polyps measuring 5-9 mm; performed cold snare with complete en bloc removal and retrieved specimen  The stomach appeared normal. Performed random biopsy using biopsy forceps to rule out H. pylori.  The duodenum appeared normal.    Colonoscopy 4/23/2024:  Findings  The terminal ileum appeared normal.;  The cecum, ascending colon, transverse colon, sigmoid colon and rectum appeared normal.;  Internal large hemorrhoids    Pathology Results:   FINAL DIAGNOSIS  A. STOMACH ANTRUM BIOPSY:   -- Antral type gastric mucosa with chemical/reactive gastropathy.  -- Fundic type gastric mucosa with mild features suggestive of proton pump inhibitor effect.  -- Helicobacter pylori-like organisms are not identified.     B. STOMACH BODY/CORPUS POLYPECTOMY:   -- Fundic gland polyp (s).     C. ESOPHAGUS DISTAL BIOPSY:      -- Fragments of squamous, squamocolumnar, and columnar gastric mucosa with focal goblet cell metaplasia.  --No evidence of dysplasia.    Per Dr. Massiel Godwin \"No severe inflammation or infection in the stomach. Biopsies of esophagus show Marie's esophagus with no cancerous cells.\"     CT enterography w contrast 5/31/2024:  IMPRESSION:  1. No suspicious small bowel pathology identified.  2. No significant hepatosplenomegaly or lymphadenopathy identified in  the abdomen or pelvis.  3. Additional findings as above.      Performance Status:  Asymptomatic    Assessment/Plan      Anemia  -normocytic anemia since at least July 2020  -Labs done on 10/30/23: Hgb 12.8, MCV 92, ferritin 39, iron saturation 26%, reticulocyte counts 1.5%, vitamin B12 285, folate 17.4, creatinine 0.86  -Previously plasma dyscrasia excluded.    -Currently, patient is taking blood builder (contains iron possibly) and multi-vitamins daily.   -Since his iron level and vitamin B12 are borderline low, advised " patient to start on ferrous sulfate one tab per day and vitamin B12 1000 mcg once per day.   -I will see him back in 3 months to follow up with labs done a few days prior to the next vsiit.     3/25/2024:   - PMH emphysema, raynaud disease and chronic NATIVIDAD dating to 2021   - Saw GI 3/20/24 for worsening hematochezia   - Labs from 2/26/24 show: WBC 5.2, hgb 11.8, plt 231,000, lymphocytes 1080, vitamin B12 647, iron saturation 6%, ferritin 21  - Going to have EGD and colonoscopy in about a month   - On protonix 40 mg orally daily   - On ferrous sulfate and vitamin B12 daily   - Has not had rectal bleeding for the last 2 weeks  - Reports feeling well, energy is good, appetite is intact with no unintentional weight loss  - Patient's labs were done about 4 weeks ago, based on his improvement of bleeding and overall feeling well with stable VSS he prefers to hold off on further lab work today   - Therefore, we will have him repeat labs 8 weeks from last set of labs (to equal 12 weeks apart) which would be about the end of May with a phone visit to review labs     6/3/2024:  - Patient presents for follow up   - Labs done 5/28/24: WBC 4.0, hgb 13.4, plt 251,000, iron saturation 23%, ferritin 25, vitamin B12 654  - His hgb has improved from 11.8 and iron saturation from 6%   - He is on oral iron every other day; I advised him to continue   - He will return for labs a FUV in 3 months to evaluate if he needs to continue his oral iron or not    10/3/2024:  - Presents for follow up   - Labs show WBC 3.9, hgb 13.1, MCV 90, plt 243,000, ALC 1,180, ANC 2,280, vitamin B12 951, iron saturation 26%, ferritin 31  - He will decrease vitamin B12 to every other day, he will continue oral iron every other day   - We discussed that his iron indices have improved, however, he remains with mild anemia with hgb 13.1 and lymphocytes remain slightly low as well   - Previously plasma dyscrasia excluded (8/2022)  - No clear indication for ongoing  normocytic anemia in regards to his PMH, medications or lab work   - Discussed with patient we can check a flow cytometry due to mildly low lymphocytes and hgb, he agrees with plan in addition to further anemia evaluation with lab work for now, and may need to discuss bone marrow biopsy in the future if labs do not improve/worsen   - He prefers to return in February   - He will have labs a week prior       2. GERD/Marie's Esophagus   -Currently on pantoprazole         Diagnoses and all orders for this visit:  Anemia, unspecified type  -     Clinic Appointment Request Follow Up (PHONE); ALLYSSA JARRELL (PHONE); (PHONE)  -     Flow Cytometry Test; Future  -     CBC and Auto Differential; Future  -     Comprehensive Metabolic Panel; Future  -     Ferritin; Future  -     Iron and TIBC; Future  -     Vitamin B12; Future  -     Folate; Future  -     Reticulocytes; Future  -     Clinic Appointment Request Follow Up; ALLYSSA JARRELL; Future  -     Haptoglobin; Future  -     Direct Antiglobulin Test; Future  -     Lactate Dehydrogenase; Future  -     C-Reactive Protein; Future  -     CHAI with Reflex to MARIELLE; Future  -     Sedimentation Rate; Future  -     Rheumatoid Factor; Future  -     Erythropoietin; Future  Other iron deficiency anemia  -     Clinic Appointment Request Follow Up (PHONE); ALLYSSA JARRELL (PHONE); (PHONE)  Lymphopenia  -     Flow Cytometry Test; Future  -     CBC and Auto Differential; Future  -     Comprehensive Metabolic Panel; Future  -     Ferritin; Future  -     Iron and TIBC; Future  -     Vitamin B12; Future  -     Folate; Future  -     Reticulocytes; Future  -     Clinic Appointment Request Follow Up; ALLYSSA JARRELL; Future  -     Haptoglobin; Future  -     Direct Antiglobulin Test; Future  -     Lactate Dehydrogenase; Future  -     C-Reactive Protein; Future  -     CHAI with Reflex to MARIELLE; Future  -     Sedimentation Rate; Future  -     Rheumatoid Factor; Future  -     Erythropoietin;  Future             Dusty Olvera, APRN-CNP

## 2024-10-02 ENCOUNTER — TELEMEDICINE (OUTPATIENT)
Dept: HEMATOLOGY/ONCOLOGY | Facility: CLINIC | Age: 59
End: 2024-10-02
Payer: COMMERCIAL

## 2024-10-02 DIAGNOSIS — D64.9 ANEMIA, UNSPECIFIED TYPE: Primary | ICD-10-CM

## 2024-10-02 DIAGNOSIS — D72.810 LYMPHOPENIA: ICD-10-CM

## 2024-10-02 DIAGNOSIS — D50.8 OTHER IRON DEFICIENCY ANEMIA: ICD-10-CM

## 2024-10-02 PROCEDURE — 99214 OFFICE O/P EST MOD 30 MIN: CPT

## 2024-12-26 DIAGNOSIS — K21.9 GASTROESOPHAGEAL REFLUX DISEASE, UNSPECIFIED WHETHER ESOPHAGITIS PRESENT: ICD-10-CM

## 2024-12-26 RX ORDER — PANTOPRAZOLE SODIUM 40 MG/1
TABLET, DELAYED RELEASE ORAL
Qty: 30 TABLET | Refills: 0 | Status: SHIPPED | OUTPATIENT
Start: 2024-12-26

## 2025-02-04 ENCOUNTER — LAB (OUTPATIENT)
Dept: LAB | Facility: HOSPITAL | Age: 60
End: 2025-02-04
Payer: COMMERCIAL

## 2025-02-04 DIAGNOSIS — D64.9 ANEMIA, UNSPECIFIED: Primary | ICD-10-CM

## 2025-02-04 DIAGNOSIS — D72.810 LYMPHOPENIA: ICD-10-CM

## 2025-02-04 DIAGNOSIS — D64.9 ANEMIA, UNSPECIFIED TYPE: ICD-10-CM

## 2025-02-04 LAB
ALBUMIN SERPL BCP-MCNC: 4.3 G/DL (ref 3.4–5)
ALP SERPL-CCNC: 61 U/L (ref 33–136)
ALT SERPL W P-5'-P-CCNC: 12 U/L (ref 10–52)
ANION GAP SERPL CALC-SCNC: 11 MMOL/L (ref 10–20)
AST SERPL W P-5'-P-CCNC: 16 U/L (ref 9–39)
BASOPHILS # BLD AUTO: 0.02 X10*3/UL (ref 0–0.1)
BASOPHILS NFR BLD AUTO: 0.4 %
BILIRUB SERPL-MCNC: 0.4 MG/DL (ref 0–1.2)
BUN SERPL-MCNC: 7 MG/DL (ref 6–23)
CALCIUM SERPL-MCNC: 9.5 MG/DL (ref 8.6–10.6)
CHLORIDE SERPL-SCNC: 103 MMOL/L (ref 98–107)
CO2 SERPL-SCNC: 28 MMOL/L (ref 21–32)
CREAT SERPL-MCNC: 0.86 MG/DL (ref 0.5–1.3)
CRP SERPL-MCNC: 0.92 MG/DL
EGFRCR SERPLBLD CKD-EPI 2021: >90 ML/MIN/1.73M*2
EOSINOPHIL # BLD AUTO: 0.18 X10*3/UL (ref 0–0.7)
EOSINOPHIL NFR BLD AUTO: 3.7 %
ERYTHROCYTE [DISTWIDTH] IN BLOOD BY AUTOMATED COUNT: 12.4 % (ref 11.5–14.5)
ERYTHROCYTE [SEDIMENTATION RATE] IN BLOOD BY WESTERGREN METHOD: 20 MM/H (ref 0–20)
FERRITIN SERPL-MCNC: 30 NG/ML (ref 20–300)
FOLATE SERPL-MCNC: 16.2 NG/ML
GLUCOSE SERPL-MCNC: 90 MG/DL (ref 74–99)
HAPTOGLOB SERPL NEPH-MCNC: 121 MG/DL (ref 30–200)
HCT VFR BLD AUTO: 37.4 % (ref 41–52)
HGB BLD-MCNC: 12.5 G/DL (ref 13.5–17.5)
HGB RETIC QN: 32 PG (ref 28–38)
HOLD SPECIMEN: NORMAL
IMM GRANULOCYTES # BLD AUTO: 0.08 X10*3/UL (ref 0–0.7)
IMM GRANULOCYTES NFR BLD AUTO: 1.6 % (ref 0–0.9)
IMMATURE RETIC FRACTION: 8.7 %
IRON SATN MFR SERPL: 9 % (ref 25–45)
IRON SERPL-MCNC: 35 UG/DL (ref 35–150)
LDH SERPL L TO P-CCNC: 106 U/L (ref 84–246)
LYMPHOCYTES # BLD AUTO: 1.25 X10*3/UL (ref 1.2–4.8)
LYMPHOCYTES NFR BLD AUTO: 25.6 %
MCH RBC QN AUTO: 30.7 PG (ref 26–34)
MCHC RBC AUTO-ENTMCNC: 33.4 G/DL (ref 32–36)
MCV RBC AUTO: 92 FL (ref 80–100)
MONOCYTES # BLD AUTO: 0.46 X10*3/UL (ref 0.1–1)
MONOCYTES NFR BLD AUTO: 9.4 %
NEUTROPHILS # BLD AUTO: 2.9 X10*3/UL (ref 1.2–7.7)
NEUTROPHILS NFR BLD AUTO: 59.3 %
NRBC BLD-RTO: 0 /100 WBCS (ref 0–0)
PLATELET # BLD AUTO: 264 X10*3/UL (ref 150–450)
POTASSIUM SERPL-SCNC: 4.3 MMOL/L (ref 3.5–5.3)
PROT SERPL-MCNC: 7.1 G/DL (ref 6.4–8.2)
RBC # BLD AUTO: 4.07 X10*6/UL (ref 4.5–5.9)
RETICS #: 0.09 X10*6/UL (ref 0.02–0.12)
RETICS/RBC NFR AUTO: 2.3 % (ref 0.5–2)
RHEUMATOID FACT SER NEPH-ACNC: <10 IU/ML (ref 0–15)
SODIUM SERPL-SCNC: 138 MMOL/L (ref 136–145)
TIBC SERPL-MCNC: 407 UG/DL (ref 240–445)
UIBC SERPL-MCNC: 372 UG/DL (ref 110–370)
VIT B12 SERPL-MCNC: 1298 PG/ML (ref 211–911)
WBC # BLD AUTO: 4.9 X10*3/UL (ref 4.4–11.3)

## 2025-02-04 PROCEDURE — 86038 ANTINUCLEAR ANTIBODIES: CPT

## 2025-02-04 PROCEDURE — 83540 ASSAY OF IRON: CPT

## 2025-02-04 PROCEDURE — 82607 VITAMIN B-12: CPT

## 2025-02-04 PROCEDURE — 86880 COOMBS TEST DIRECT: CPT

## 2025-02-04 PROCEDURE — 88184 FLOWCYTOMETRY/ TC 1 MARKER: CPT

## 2025-02-04 PROCEDURE — 86225 DNA ANTIBODY NATIVE: CPT

## 2025-02-04 PROCEDURE — 88185 FLOWCYTOMETRY/TC ADD-ON: CPT

## 2025-02-04 PROCEDURE — 83010 ASSAY OF HAPTOGLOBIN QUANT: CPT

## 2025-02-04 PROCEDURE — 82668 ASSAY OF ERYTHROPOIETIN: CPT

## 2025-02-04 PROCEDURE — 86431 RHEUMATOID FACTOR QUANT: CPT

## 2025-02-04 PROCEDURE — 88189 FLOWCYTOMETRY/READ 16 & >: CPT

## 2025-02-04 PROCEDURE — 82728 ASSAY OF FERRITIN: CPT

## 2025-02-04 PROCEDURE — 85025 COMPLETE CBC W/AUTO DIFF WBC: CPT

## 2025-02-04 PROCEDURE — 83550 IRON BINDING TEST: CPT

## 2025-02-04 PROCEDURE — 85045 AUTOMATED RETICULOCYTE COUNT: CPT

## 2025-02-04 PROCEDURE — 83615 LACTATE (LD) (LDH) ENZYME: CPT

## 2025-02-04 PROCEDURE — 86235 NUCLEAR ANTIGEN ANTIBODY: CPT

## 2025-02-04 PROCEDURE — 80053 COMPREHEN METABOLIC PANEL: CPT

## 2025-02-04 PROCEDURE — 82746 ASSAY OF FOLIC ACID SERUM: CPT

## 2025-02-04 PROCEDURE — 85652 RBC SED RATE AUTOMATED: CPT

## 2025-02-04 PROCEDURE — 86140 C-REACTIVE PROTEIN: CPT

## 2025-02-05 ENCOUNTER — LAB (OUTPATIENT)
Dept: LAB | Facility: HOSPITAL | Age: 60
End: 2025-02-05
Payer: COMMERCIAL

## 2025-02-05 DIAGNOSIS — D64.9 ANEMIA, UNSPECIFIED TYPE: ICD-10-CM

## 2025-02-05 DIAGNOSIS — D72.810 LYMPHOPENIA: ICD-10-CM

## 2025-02-05 LAB
ANA PATTERN: ABNORMAL
ANA SER QL HEP2 SUBST: POSITIVE
ANA TITR SER IF: ABNORMAL {TITER}
CENTROMERE B AB SER-ACNC: <0.2 AI
CHROMATIN AB SERPL-ACNC: <0.2 AI
DAT-POLYSPECIFIC: NORMAL
DSDNA AB SER-ACNC: 1 IU/ML
ENA JO1 AB SER QL IA: <0.2 AI
ENA RNP AB SER IA-ACNC: 0.2 AI
ENA SCL70 AB SER QL IA: <0.2 AI
ENA SM AB SER IA-ACNC: <0.2 AI
ENA SM+RNP AB SER QL IA: <0.2 AI
ENA SS-A AB SER IA-ACNC: <0.2 AI
ENA SS-B AB SER IA-ACNC: <0.2 AI
RIBOSOMAL P AB SER-ACNC: <0.2 AI

## 2025-02-06 LAB
CELL COUNT (BLOOD): 4.9 X10*3/UL
CELL POPULATIONS: NORMAL
DIAGNOSIS: NORMAL
EPO SERPL-ACNC: 8 MU/ML (ref 4–27)
FLOW DIFFERENTIAL: NORMAL
FLOW TEST ORDERED: NORMAL
LAB TEST METHOD: NORMAL
NUMBER OF CELLS COLLECTED: NORMAL PER TUBE
PATH REPORT.TOTAL CANCER: NORMAL
SIGNATURE COMMENT: NORMAL
SPECIMEN VIABILITY: NORMAL

## 2025-02-07 ENCOUNTER — APPOINTMENT (OUTPATIENT)
Dept: HEMATOLOGY/ONCOLOGY | Facility: CLINIC | Age: 60
End: 2025-02-07
Payer: COMMERCIAL

## 2025-02-09 NOTE — PROGRESS NOTES
"Patient ID: Keenan Wharton is a 59 y.o. male.  Primary care physician:     Interval History:   9/28/2021: Initial visit   -h/o progressive normocytic anemia, starting July 2020. On 9/20/21  WBC 3.4, Hgb 10.2, Hct 32.8, MCV 84, Plt 226; iron 43, TIBC >493, sat NC, ferritin 12  -has been on a \"blood builder\"  supplement which contains iron for about 3 months.  -has noticed mild increase in fatigue, but especially less exercise tolerance with some HELMS. He is an active runner and has had to cut back. Possible restless legs at night.  -denies indigestion/reflux but is on protonix to control this. Denies n/v, c/d, melena, hematochezia. Occ BRBPR attributed to hemorrhoids noted on colonoscopy 9/2020. denies hematuria  -eats a regular diet, no gluten intolerance, weight stable.  -denies HELMS, chest pain, abd pain/distention.  -9/2020  colonoscopy/EGD: +hemorrhoids, otherwise unremarkable per patient    Selected labs prior to the initial consult:   11/11/2008  WBC 4.0, Hgb 12.3, Hct 38.1, Plt 212; iron 57, TIBC 459, sat 12%  6/14/2011 WBC 4.7, Hgb 13.3, Hct 40, Plt 203; iron 50, TIBC 363, sat 14%, ferr 30  10/22/2015  wBC 4.3, ANC 2.57, ALC 1.19, Hgb 13.4, Hct 40.8, Plt 189  1/26/2018  WBC 4.5, ANC 2.6, Hgb 13, Hct 39.5, Plt 210  12/3/2018  WBC 4.1, ANC 2.27, Hgb 13.1, Hct 39.7, MCV 94, Plt 198  9/27/2019  wBC 4.0, ANC 2.46, Hgb 13.3, Hct 41.1, Plt 206  7/2/2020  wBC 3.6, ANC 1.87, ALC 1.17, Hgb 12.2, Hct 37.2, MCV 93, Plt 194; tSH 1.25, PSA 1.48  8/13/2021  WBC 3.6, ANC 2.22, ALC 0.7, Hgb 10.4, Hct 33.8, MCV 86, Plt 224; Hep C neg; creat 0.96, Ca+ 9.3, t pro 7.2, t bili 0.4, ast 17, alt 10, alkp 43  9/20/2021  wBC 3.4, Hgb 10.2, Hct 32.8, MCV 84, Plt 226; retic 1.1; Vit B12 352, folate 21.3, iron 43, TIBC >493, sat NC, ferr 12    Sep, 2021: Advised patient to start on oral iron once per day.     8/1/22: , SPEP with IF normal,     11/30/24:  -Returned for follow up for anemia. Patient lost follow up since March, " 2022  -Clinically doing well today. Admits reasonable energy level. States that he is no longer feeling sluggish.   -States that he is taking blood builder and multi-vitamins daily.     3/25/2024:   - Patient presents for follow-up visit, his previous provider relocated therefore I have taken over his care, this is our first encounter together  - Patient reports that he saw GI last week due to ongoing hematochezia, they recommended a colonoscopy and EGD  - Patient states that he has not had any hematochezia in the last 2 weeks  - Patient reports he has a family history of esophageal cancer, he does have reflux but says it is controlled and denies dysphagia  - Patient reports that he has been feeling well and that his energy is good  - He states that he has no complaints or symptoms going on, and denies any changes or hospital stays since last visit  - Patient denies shortness of breath, chest pain, heart palpitations, melena, nausea, vomiting, constipation, diarrhea, fevers, chills, new lumps or bumps, edema or concerns  - Patient is on Protonix 40 mg daily, iron supplementation tablet with vitamin C daily, and vitamin B12 supplemental tablet once daily      6/3/2024: Consent:  A telephone visit (audio only) between the patient at home and the provider at [facility] at [place] was utilized to provide this telehealth service.  - Patient presents for follow up   - He reports his hemorrhoidal bleeding has resolved, last episode was about a month ago and before that was 3 weeks before   - He is now on a fiber supplement daily   - He reports he had an EGD, colonoscopy and CT enterography; he reports he was notified no abnormalities   - He feels more energy and denies complaints/concerns   - Although his hemorrhoidal bleeding has improved he is still seeing colorectal surgery this week     10/2/2024: Consent:  A telephone visit (audio only) between the patient at home and the provider at [facility]  "at [place] was utilized to provide this telehealth service.    - Patient presents for follow up   - Denies complaints or concerns   - He reports feeling well, is taking oral iron every other day and tolerating well  - He says he is not longer having bright red blood per rectum and denies other signs of bleeding  - Denies B symptoms   - He reports no family history of blood cancers       Subjective    -Patient presents for follow-up visit.  Recent blood work done 2/4/2025 shows WBC 4.9, hemoglobin 12.5, MCV 92, platelets 264,000, retake 2.3%, vitamin B12 1298, iron saturation 9%, ferritin 30, folate 16.2, haptoglobin 121, , EPO 8, creatinine 0.86, unremarkable hepatic function, that probably specific negative, flow cytometry negative, CHAI positive with negative reflex.    Patient says leading up to colonoscopy in April last year his rectal bleeding had went away. He had about 6 months without any bleeding. Slowly the bleeding has restarted. It has been more pronounced in last few months. He says he has been told there are surgical options if needed, he prefers to hold off on surgery at this time. He says bleeding varies, can be daily and has been more often in December and January. Amount seems small to medium \"depending\" with bowel movements. He says the bleeding has stopped in the last few days. Denies straining. He reports he feels good. Energy level is unchanged. Denies rashes. Some joint pain, mostly in left thumb and hands, his mom has a hx of RA. He drinks coffee and eats a diet that is more so vegetarian.     He follows with Cynthia Fofana NP in gastroenterology for rectal bleeding and hemorrhoids.  Recall his last colonoscopy was 4/23/2024.  He had a CTE on 5/31/2024.  An anoscopy on 6/5/2024 showed large internal nonbleeding hemorrhoids.No history of IV iron. Remains on daily iron and PPI.     Social History     Tobacco Use    Smoking status: Never    Smokeless tobacco: Never   Vaping Use    Vaping " status: Never Used   Substance Use Topics    Alcohol use: Yes     Comment: 10-12 drinks a week    Drug use: Not Currently        Objective    Visit Vitals  Smoking Status Never   Visit Vitals  /66 (BP Location: Right arm, Patient Position: Sitting, BP Cuff Size: Adult)   Pulse 60   Temp 36.2 °C (97.2 °F) (Temporal)   Resp 16   Wt 68.5 kg (151 lb 0.2 oz)   SpO2 98%   BMI 23.65 kg/m²   Smoking Status Never   BSA 1.8 m²     Physical Exam  Vitals reviewed.   Constitutional:       Appearance: Normal appearance.   HENT:      Head: Normocephalic and atraumatic.      Nose: Nose normal.      Mouth/Throat:      Mouth: Mucous membranes are moist.      Pharynx: Oropharynx is clear.   Eyes:      Extraocular Movements: Extraocular movements intact.      Conjunctiva/sclera: Conjunctivae normal.      Pupils: Pupils are equal, round, and reactive to light.   Cardiovascular:      Rate and Rhythm: Normal rate and regular rhythm.      Pulses: Normal pulses.      Heart sounds: Normal heart sounds.   Pulmonary:      Effort: Pulmonary effort is normal.      Breath sounds: Normal breath sounds.   Abdominal:      General: Bowel sounds are normal.      Palpations: Abdomen is soft.   Musculoskeletal:         General: Normal range of motion.      Cervical back: Normal range of motion.   Skin:     General: Skin is warm and dry.   Neurological:      General: No focal deficit present.      Mental Status: He is alert and oriented to person, place, and time.   Psychiatric:         Mood and Affect: Mood normal.         Behavior: Behavior normal.         Thought Content: Thought content normal.         Judgment: Judgment normal.          Labs:  Lab Results   Component Value Date    WBC 4.9 02/04/2025    NEUTROABS 2.90 02/04/2025    IGABSOL 0.08 02/04/2025    LYMPHSABS 1.25 02/04/2025    MONOSABS 0.46 02/04/2025    EOSABS 0.18 02/04/2025    BASOSABS 0.02 02/04/2025    RBC 4.07 (L) 02/04/2025    MCV 92 02/04/2025    MCHC 33.4 02/04/2025    HGB  "12.5 (L) 02/04/2025    HCT 37.4 (L) 02/04/2025     02/04/2025     Lab Results   Component Value Date    CREATININE 0.86 02/04/2025    BUN 7 02/04/2025    EGFR >90 02/04/2025     02/04/2025    K 4.3 02/04/2025     02/04/2025    CO2 28 02/04/2025      Lab Results   Component Value Date    ALT 12 02/04/2025    AST 16 02/04/2025    ALKPHOS 61 02/04/2025    BILITOT 0.4 02/04/2025      Lab Results   Component Value Date    IRON 35 02/04/2025    TIBC 407 02/04/2025    FERRITIN 30 02/04/2025      Lab Results   Component Value Date    OXARXCPO83 1,298 (H) 02/04/2025      Lab Results   Component Value Date    FOLATE 16.2 02/04/2025         EGD 4/23/2024:  Findings  Pick[place]-colored mucosa with 1 tongue measuring 20 mm in the esophagus; performed cold forceps biopsy to rule out Marie's esophagus;  3 cm hiatal hernia - GE junction 40 cm from the incisors, diaphragmatic impression 43 cm from the incisors  Multiple semi-pedunculated fundic gland polyps measuring 5-9 mm; performed cold snare with complete en bloc removal and retrieved specimen  The stomach appeared normal. Performed random biopsy using biopsy forceps to rule out H. pylori.  The duodenum appeared normal.    Colonoscopy 4/23/2024:  Findings  The terminal ileum appeared normal.;  The cecum, ascending colon, transverse colon, sigmoid colon and rectum appeared normal.;  Internal large hemorrhoids    Pathology Results:   FINAL DIAGNOSIS  A. STOMACH ANTRUM BIOPSY:   -- Antral type gastric mucosa with chemical/reactive gastropathy.  -- Fundic type gastric mucosa with mild features suggestive of proton pump inhibitor effect.  -- Helicobacter pylori-like organisms are not identified.     B. STOMACH BODY/CORPUS POLYPECTOMY:   -- Fundic gland polyp (s).     C. ESOPHAGUS DISTAL BIOPSY:      -- Fragments of squamous, squamocolumnar, and columnar gastric mucosa with focal goblet cell metaplasia.  --No evidence of dysplasia.    Per Dr. Massiel Godwin \"No " "severe inflammation or infection in the stomach. Biopsies of esophagus show Marie's esophagus with no cancerous cells.\"     CT enterography w contrast 5/31/2024:  IMPRESSION:  1. No suspicious small bowel pathology identified.  2. No significant hepatosplenomegaly or lymphadenopathy identified in  the abdomen or pelvis.  3. Additional findings as above.      Performance Status:  Asymptomatic    Assessment/Plan      Anemia  -normocytic anemia since at least July 2020  -Labs done on 10/30/23: Hgb 12.8, MCV 92, ferritin 39, iron saturation 26%, reticulocyte counts 1.5%, vitamin B12 285, folate 17.4, creatinine 0.86  -Previously plasma dyscrasia excluded.    -Currently, patient is taking blood builder (contains iron possibly) and multi-vitamins daily.   -Since his iron level and vitamin B12 are borderline low, advised patient to start on ferrous sulfate one tab per day and vitamin B12 1000 mcg once per day.   -I will see him back in 3 months to follow up with labs done a few days prior to the next vsiit.     3/25/2024:   - PMH emphysema, raynaud disease and chronic NATIVIDAD dating to 2021   - Saw GI 3/20/24 for worsening hematochezia   - Labs from 2/26/24 show: WBC 5.2, hgb 11.8, plt 231,000, lymphocytes 1080, vitamin B12 647, iron saturation 6%, ferritin 21  - Going to have EGD and colonoscopy in about a month   - On protonix 40 mg orally daily   - On ferrous sulfate and vitamin B12 daily   - Has not had rectal bleeding for the last 2 weeks  - Reports feeling well, energy is good, appetite is intact with no unintentional weight loss  - Patient's labs were done about 4 weeks ago, based on his improvement of bleeding and overall feeling well with stable VSS he prefers to hold off on further lab work today   - Therefore, we will have him repeat labs 8 weeks from last set of labs (to equal 12 weeks apart) which would be about the end of May with a phone visit to review labs     6/3/2024:  - Patient presents for follow up   - " Labs done 5/28/24: WBC 4.0, hgb 13.4, plt 251,000, iron saturation 23%, ferritin 25, vitamin B12 654  - His hgb has improved from 11.8 and iron saturation from 6%   - He is on oral iron every other day; I advised him to continue   - He will return for labs a FUV in 3 months to evaluate if he needs to continue his oral iron or not    10/2/2024:  - Presents for follow up   - Labs show WBC 3.9, hgb 13.1, MCV 90, plt 243,000, ALC 1,180, ANC 2,280, vitamin B12 951, iron saturation 26%, ferritin 31  - He will decrease vitamin B12 to every other day, he will continue oral iron every other day   - We discussed that his iron indices have improved, however, he remains with mild anemia with hgb 13.1 and lymphocytes remain slightly low as well   - Previously plasma dyscrasia excluded (8/2022)  - No clear indication for ongoing normocytic anemia in regards to his PMH, medications or lab work   - Discussed with patient we can check a flow cytometry due to mildly low lymphocytes and hgb, he agrees with plan in addition to further anemia evaluation with lab work for now, and may need to discuss bone marrow biopsy in the future if labs do not improve/worsen   - He prefers to return in February   - He will have labs a week prior     2/10/2025:   - Presents for follow up visit  - WBC 4.9, hemoglobin 12.5, MCV 92, platelets 264,000, retake 2.3%, vitamin B12 1298, iron saturation 9%, ferritin 30, folate 16.2, haptoglobin 121, , EPO 8, creatinine 0.86, unremarkable hepatic function, that probably specific negative, flow cytometry negative, CHAI positive with negative reflex.  - Discussed results with patient including positive CHAI with negative reflex, will hold off on rheumatology referral at this time   - Negative flow cytometry drawn for leukopenia (WBC now normal)  - Discussed hgb and iron saturation have come down, which correlates with increased hemorrhoidal bleeding he has experienced lately, which has not subsided he  reports   - In addition, he is on PPI daily   - Discussed IV iron but patient prefers to hold off   - He will continue oral iron daily but will take later in the day, away from his PPI   - Discussed iron rich handouts, given to patient   - Discussed importance to call if bleeding returns or signs/symptoms of anemia develop   - We will do a closer FUV with him to monitor counts closely, RTC in 6 weeks for phone visit with labs ahead of time     2. GERD/Marie's Esophagus   -Currently on pantoprazole         Diagnoses and all orders for this visit:  Anemia, unspecified type  -     Clinic Appointment Request Follow Up; ALLYSSA JARRELL  -     CBC and Auto Differential; Future  -     Ferritin; Future  -     Iron and TIBC; Future  -     Clinic Appointment Request (phone); ALLYSSA JARRELL (phone); (phone); Future  Lymphopenia  -     Clinic Appointment Request Follow Up; ALLYSSA JARRELL APRN-CNP

## 2025-02-10 ENCOUNTER — OFFICE VISIT (OUTPATIENT)
Dept: HEMATOLOGY/ONCOLOGY | Facility: CLINIC | Age: 60
End: 2025-02-10
Payer: COMMERCIAL

## 2025-02-10 ENCOUNTER — APPOINTMENT (OUTPATIENT)
Facility: CLINIC | Age: 60
End: 2025-02-10
Payer: COMMERCIAL

## 2025-02-10 VITALS
SYSTOLIC BLOOD PRESSURE: 114 MMHG | HEART RATE: 64 BPM | DIASTOLIC BLOOD PRESSURE: 71 MMHG | BODY MASS INDEX: 23.7 KG/M2 | HEIGHT: 67 IN | OXYGEN SATURATION: 98 % | WEIGHT: 151 LBS | TEMPERATURE: 97.5 F | RESPIRATION RATE: 18 BRPM

## 2025-02-10 VITALS
HEART RATE: 60 BPM | WEIGHT: 151.01 LBS | TEMPERATURE: 97.2 F | RESPIRATION RATE: 16 BRPM | OXYGEN SATURATION: 98 % | DIASTOLIC BLOOD PRESSURE: 66 MMHG | SYSTOLIC BLOOD PRESSURE: 110 MMHG | BODY MASS INDEX: 23.65 KG/M2

## 2025-02-10 DIAGNOSIS — Z12.5 SCREENING PSA (PROSTATE SPECIFIC ANTIGEN): ICD-10-CM

## 2025-02-10 DIAGNOSIS — K21.9 GASTROESOPHAGEAL REFLUX DISEASE, UNSPECIFIED WHETHER ESOPHAGITIS PRESENT: ICD-10-CM

## 2025-02-10 DIAGNOSIS — D72.810 LYMPHOPENIA: ICD-10-CM

## 2025-02-10 DIAGNOSIS — Z82.49 FAMILY HISTORY OF EARLY CAD: ICD-10-CM

## 2025-02-10 DIAGNOSIS — D64.9 ANEMIA, UNSPECIFIED TYPE: ICD-10-CM

## 2025-02-10 DIAGNOSIS — J43.9 PULMONARY EMPHYSEMA, UNSPECIFIED EMPHYSEMA TYPE (MULTI): ICD-10-CM

## 2025-02-10 DIAGNOSIS — Z00.00 HEALTH CARE MAINTENANCE: Primary | ICD-10-CM

## 2025-02-10 DIAGNOSIS — K21.9 GASTROESOPHAGEAL REFLUX DISEASE WITHOUT ESOPHAGITIS: ICD-10-CM

## 2025-02-10 DIAGNOSIS — Z82.49 FAMILY HISTORY OF ABDOMINAL AORTIC ANEURYSM (AAA): ICD-10-CM

## 2025-02-10 DIAGNOSIS — Z87.19 HISTORY OF BARRETT'S ESOPHAGUS: ICD-10-CM

## 2025-02-10 DIAGNOSIS — Z23 NEED FOR PNEUMOCOCCAL VACCINE: ICD-10-CM

## 2025-02-10 DIAGNOSIS — D64.9 ANEMIA, UNSPECIFIED TYPE: Primary | ICD-10-CM

## 2025-02-10 LAB
POC APPEARANCE, URINE: CLEAR
POC BILIRUBIN, URINE: NEGATIVE
POC BLOOD, URINE: NEGATIVE
POC COLOR, URINE: YELLOW
POC GLUCOSE, URINE: NEGATIVE MG/DL
POC KETONES, URINE: NEGATIVE MG/DL
POC LEUKOCYTES, URINE: NEGATIVE
POC NITRITE,URINE: NEGATIVE
POC PH, URINE: 6.5 PH
POC PROTEIN, URINE: NEGATIVE MG/DL
POC SPECIFIC GRAVITY, URINE: 1.01
POC UROBILINOGEN, URINE: 0.2 EU/DL

## 2025-02-10 PROCEDURE — 99214 OFFICE O/P EST MOD 30 MIN: CPT

## 2025-02-10 PROCEDURE — 3008F BODY MASS INDEX DOCD: CPT | Performed by: FAMILY MEDICINE

## 2025-02-10 PROCEDURE — 1036F TOBACCO NON-USER: CPT | Performed by: FAMILY MEDICINE

## 2025-02-10 PROCEDURE — 99396 PREV VISIT EST AGE 40-64: CPT | Performed by: FAMILY MEDICINE

## 2025-02-10 PROCEDURE — 81002 URINALYSIS NONAUTO W/O SCOPE: CPT | Performed by: FAMILY MEDICINE

## 2025-02-10 PROCEDURE — 90471 IMMUNIZATION ADMIN: CPT | Performed by: FAMILY MEDICINE

## 2025-02-10 PROCEDURE — 90677 PCV20 VACCINE IM: CPT | Performed by: FAMILY MEDICINE

## 2025-02-10 RX ORDER — PANTOPRAZOLE SODIUM 40 MG/1
TABLET, DELAYED RELEASE ORAL
Qty: 30 TABLET | Refills: 11 | Status: SHIPPED | OUTPATIENT
Start: 2025-02-10

## 2025-02-10 ASSESSMENT — ENCOUNTER SYMPTOMS
HEADACHES: 0
SHORTNESS OF BREATH: 0

## 2025-02-10 ASSESSMENT — PAIN SCALES - GENERAL: PAINLEVEL_OUTOF10: 0-NO PAIN

## 2025-02-10 NOTE — PROGRESS NOTES
"Subjective     Keenan Wharton is a 59 y.o. male who presents for Annual Exam (Per PT sees cardiologist.).    [facility]     Pt is here today for annual well exam.     Pt sees  cardiology, Dr. Lizarraga, due to strong family history of CAD.      Pt had evaluation by [facility] Dr. Trimble in 2018 for prevention and had screening AAA US (negative) done given family hx of AA in brother and father.      Pt had CT calcium score of 0 in 2022 - the ct did note some Mild emphysematous changes (nonsmoker but second hand smoke exposure) so he had another CT chest done by pulmonologist in Feb 2023.  Pt was told he did not need further follow up on his lung findings per pulmonologist.      Pt denies chronic cough or shortness of breath.     Colonoscopy and EGD done 4/23/24 by  GI due to rectal bleeding, anemia (fe def) and GERD.  His  colonoscopy showed large hemorrhoids.  He was referred to colorectal specialist and had anoscope in June 2024.  No hemorrhoid procedure was done.  His EGD showed hiatal hernia. Pt is on PPI daily.      Hx of GERD/Barretts esophagus , anemia (fe def), Emphysema, raynaud's, optic migraines.     Pt sees  heme for hx of fe def anemia, he was told to increase iron to daily use instead of every other day.      Family hx of melanoma -pt sees derm routinely.  No personal hx of skin cancer.     Review of Systems   Respiratory:  Negative for shortness of breath.    Cardiovascular:  Negative for chest pain.   Neurological:  Negative for headaches.       Objective     Vitals:    02/10/25 1255   BP: 114/71   BP Location: Left arm   Patient Position: Sitting   Pulse: 64   Resp: 18   Temp: 36.4 °C (97.5 °F)   TempSrc: Temporal   SpO2: 98%   Weight: 68.5 kg (151 lb)   Height: 1.702 m (5' 7\")        Current Outpatient Medications   Medication Instructions    cyanocobalamin (VITAMIN B-12) 250 mcg, Daily    ferrous sulfate 65 mg, Daily with breakfast    pantoprazole (ProtoNix) 40 mg EC tablet TAKE 1 TABLET BY " MOUTH DAILY        Allergies   Allergen Reactions    Lactose Nausea/vomiting     Lactose intolerant        Past Surgical History:   Procedure Laterality Date    OTHER SURGICAL HISTORY  08/11/2021    Esophagogastroduodenoscopy    OTHER SURGICAL HISTORY  08/11/2021    Colonoscopy        Social History     Tobacco Use    Smoking status: Never    Smokeless tobacco: Never   Vaping Use    Vaping status: Never Used   Substance Use Topics    Alcohol use: Yes     Comment: 10-12 drinks a week    Drug use: Not Currently        No family history on file.     Immunization History   Administered Date(s) Administered    COVID-19, mRNA, LNP-S, PF, 30 mcg/0.3 mL dose 03/16/2021, 04/06/2021, 12/20/2021    Flu vaccine, quadrivalent, no egg protein, age 6 month or greater (FLUCELVAX) 12/28/2022    Flu vaccine, trivalent, preservative free, HIGH-DOSE, age 65y+ (Fluzone) 10/17/2014    Flu vaccine, trivalent, preservative free, no egg protein, age 6 months or greater (Flucelvax) 11/17/2024    Influenza Whole 11/29/2013    Influenza, injectable, quadrivalent 11/06/2017    Influenza, seasonal, injectable 10/09/2015, 10/12/2016, 11/14/2018, 09/25/2019, 10/20/2021    Moderna COVID-19 vaccine, 12 years and older (50mcg/0.5mL)(Spikevax) 11/17/2024    Pfizer COVID-19 vaccine, 12 years and older, (30mcg/0.3mL) (Comirnaty) 12/02/2023    Pfizer COVID-19 vaccine, bivalent, age 12 years and older (30 mcg/0.3 mL) 10/16/2022    Tdap vaccine, age 7 year and older (BOOSTRIX, ADACEL) 08/12/2022    Zoster vaccine, recombinant, adult (SHINGRIX) 08/11/2021, 10/20/2021        Physical Exam  Vitals reviewed.   Constitutional:       General: He is not in acute distress.     Appearance: Normal appearance. He is not ill-appearing.   HENT:      Head: Normocephalic and atraumatic.      Right Ear: Tympanic membrane, ear canal and external ear normal.      Left Ear: Tympanic membrane, ear canal and external ear normal.      Mouth/Throat:      Mouth: Mucous membranes  are moist.      Pharynx: No oropharyngeal exudate or posterior oropharyngeal erythema.   Neck:      Thyroid: No thyroid mass or thyromegaly.   Cardiovascular:      Rate and Rhythm: Normal rate and regular rhythm.      Heart sounds: No murmur heard.  Pulmonary:      Effort: No respiratory distress.      Breath sounds: Normal breath sounds. No wheezing, rhonchi or rales.   Abdominal:      General: There is no distension.      Palpations: Abdomen is soft.      Tenderness: There is no abdominal tenderness.   Lymphadenopathy:      Cervical: No cervical adenopathy.   Skin:     General: Skin is warm and dry.      Findings: No rash.   Neurological:      Mental Status: He is alert and oriented to person, place, and time. Mental status is at baseline.   Psychiatric:         Mood and Affect: Mood normal.         Behavior: Behavior normal.         Assessment & Plan  Health care maintenance  Well Exam     Colon screening - Colonoscopy is up to date     Vaccines - tdap, shingrix, flu vaccine utd    Prevnar 20 given today    I recommend yearly flu vaccine and routine COVID vaccinations as indicated     Complete labs    Healthy diet, routine exercise was discussed with patient         Orders:    POCT UA (nonautomated) manually resulted    Vascular US Abdominal Aorta Aneurysm AAA Screening; Future    Lipid Panel; Future    Hemoglobin A1C; Future    Need for pneumococcal vaccine    Orders:    Pneumococcal conjugate vaccine, 20-valent (PREVNAR 20)    Pulmonary emphysema, unspecified emphysema type (Multi)  Noted on CT imaging from 2022/2023.  Pt evaluated by pulmonology in 2023.  Nonsmoker.  No hx of smoking.  Pt asymptomatic.        Gastroesophageal reflux disease without esophagitis  Sees GI, on PPI, hx of barretts        Anemia, unspecified type  Fe dec, on iron supplement, sees GI, sees heme.        Family history of early CAD  Sees  cardiology  Orders:    Lipid Panel; Future    Hemoglobin A1C; Future    History of Marie's  esophagus  Sees GI - on PPI , utd with EGD        Family history of abdominal aortic aneurysm (AAA)    Orders:    Vascular US Abdominal Aorta Aneurysm AAA Screening; Future    Screening PSA (prostate specific antigen)    Orders:    Prostate Specific Antigen; Future

## 2025-02-10 NOTE — ASSESSMENT & PLAN NOTE
Noted on CT imaging from 2022/2023.  Pt evaluated by pulmonology in 2023.  Nonsmoker.  No hx of smoking.  Pt asymptomatic.

## 2025-02-19 LAB
CHOLEST SERPL-MCNC: 206 MG/DL
CHOLEST/HDLC SERPL: 3.7 (CALC)
EST. AVERAGE GLUCOSE BLD GHB EST-MCNC: 103 MG/DL
EST. AVERAGE GLUCOSE BLD GHB EST-SCNC: 5.7 MMOL/L
HBA1C MFR BLD: 5.2 % OF TOTAL HGB
HDLC SERPL-MCNC: 55 MG/DL
LDLC SERPL CALC-MCNC: 133 MG/DL (CALC)
NONHDLC SERPL-MCNC: 151 MG/DL (CALC)
PSA SERPL-MCNC: 1.92 NG/ML
TRIGL SERPL-MCNC: 84 MG/DL

## 2025-03-26 ENCOUNTER — LAB (OUTPATIENT)
Dept: LAB | Facility: HOSPITAL | Age: 60
End: 2025-03-26
Payer: COMMERCIAL

## 2025-03-26 DIAGNOSIS — D64.9 ANEMIA, UNSPECIFIED TYPE: ICD-10-CM

## 2025-03-26 DIAGNOSIS — D64.9 ANEMIA, UNSPECIFIED: Primary | ICD-10-CM

## 2025-03-26 LAB
BASOPHILS # BLD AUTO: 0.04 X10*3/UL (ref 0–0.1)
BASOPHILS NFR BLD AUTO: 1 %
EOSINOPHIL # BLD AUTO: 0.08 X10*3/UL (ref 0–0.7)
EOSINOPHIL NFR BLD AUTO: 2 %
ERYTHROCYTE [DISTWIDTH] IN BLOOD BY AUTOMATED COUNT: 12 % (ref 11.5–14.5)
FERRITIN SERPL-MCNC: 21 NG/ML (ref 20–300)
HCT VFR BLD AUTO: 35.3 % (ref 41–52)
HGB BLD-MCNC: 11.7 G/DL (ref 13.5–17.5)
IMM GRANULOCYTES # BLD AUTO: 0 X10*3/UL (ref 0–0.7)
IMM GRANULOCYTES NFR BLD AUTO: 0 % (ref 0–0.9)
IRON SATN MFR SERPL: 9 % (ref 25–45)
IRON SERPL-MCNC: 41 UG/DL (ref 35–150)
LYMPHOCYTES # BLD AUTO: 1.29 X10*3/UL (ref 1.2–4.8)
LYMPHOCYTES NFR BLD AUTO: 32.1 %
MCH RBC QN AUTO: 30.2 PG (ref 26–34)
MCHC RBC AUTO-ENTMCNC: 33.1 G/DL (ref 32–36)
MCV RBC AUTO: 91 FL (ref 80–100)
MONOCYTES # BLD AUTO: 0.38 X10*3/UL (ref 0.1–1)
MONOCYTES NFR BLD AUTO: 9.5 %
NEUTROPHILS # BLD AUTO: 2.23 X10*3/UL (ref 1.2–7.7)
NEUTROPHILS NFR BLD AUTO: 55.4 %
NRBC BLD-RTO: 0 /100 WBCS (ref 0–0)
PLATELET # BLD AUTO: 266 X10*3/UL (ref 150–450)
RBC # BLD AUTO: 3.88 X10*6/UL (ref 4.5–5.9)
TIBC SERPL-MCNC: 466 UG/DL (ref 240–445)
UIBC SERPL-MCNC: 425 UG/DL (ref 110–370)
WBC # BLD AUTO: 4 X10*3/UL (ref 4.4–11.3)

## 2025-03-26 PROCEDURE — 85025 COMPLETE CBC W/AUTO DIFF WBC: CPT

## 2025-03-26 PROCEDURE — 83540 ASSAY OF IRON: CPT

## 2025-03-26 PROCEDURE — 83550 IRON BINDING TEST: CPT

## 2025-03-26 PROCEDURE — 82728 ASSAY OF FERRITIN: CPT

## 2025-03-27 ENCOUNTER — TELEPHONE (OUTPATIENT)
Dept: HEMATOLOGY/ONCOLOGY | Facility: CLINIC | Age: 60
End: 2025-03-27
Payer: COMMERCIAL

## 2025-03-27 NOTE — PROGRESS NOTES
"  Patient ID: Keenan Wharton is a 59 y.o. male.  Diagnosis: Iron deficiency anemia   Primary Care Provider: Keenan Easley DO  Visit Type: Follow Up    Date of Service: 03/27/25    Current Treatment: Oral iron every other day     Subjective    Initial History:   9/28/2021: Initial visit   -h/o progressive normocytic anemia, starting July 2020. On 9/20/21  WBC 3.4, Hgb 10.2, Hct 32.8, MCV 84, Plt 226; iron 43, TIBC >493, sat NC, ferritin 12  -has been on a \"blood builder\"  supplement which contains iron for about 3 months.  -has noticed mild increase in fatigue, but especially less exercise tolerance with some HELMS. He is an active runner and has had to cut back. Possible restless legs at night.  -denies indigestion/reflux but is on protonix to control this. Denies n/v, c/d, melena, hematochezia. Occ BRBPR attributed to hemorrhoids noted on colonoscopy 9/2020. denies hematuria  -eats a regular diet, no gluten intolerance, weight stable.  -denies HELMS, chest pain, abd pain/distention.  -9/2020  colonoscopy/EGD: +hemorrhoids, otherwise unremarkable per patient     Selected labs prior to the initial consult:   11/11/2008  WBC 4.0, Hgb 12.3, Hct 38.1, Plt 212; iron 57, TIBC 459, sat 12%  6/14/2011 WBC 4.7, Hgb 13.3, Hct 40, Plt 203; iron 50, TIBC 363, sat 14%, ferr 30  10/22/2015  wBC 4.3, ANC 2.57, ALC 1.19, Hgb 13.4, Hct 40.8, Plt 189  1/26/2018  WBC 4.5, ANC 2.6, Hgb 13, Hct 39.5, Plt 210  12/3/2018  WBC 4.1, ANC 2.27, Hgb 13.1, Hct 39.7, MCV 94, Plt 198  9/27/2019  wBC 4.0, ANC 2.46, Hgb 13.3, Hct 41.1, Plt 206  7/2/2020  wBC 3.6, ANC 1.87, ALC 1.17, Hgb 12.2, Hct 37.2, MCV 93, Plt 194; tSH 1.25, PSA 1.48  8/13/2021  WBC 3.6, ANC 2.22, ALC 0.7, Hgb 10.4, Hct 33.8, MCV 86, Plt 224; Hep C neg; creat 0.96, Ca+ 9.3, t pro 7.2, t bili 0.4, ast 17, alt 10, alkp 43  9/20/2021  wBC 3.4, Hgb 10.2, Hct 32.8, MCV 84, Plt 226; retic 1.1; Vit B12 352, folate 21.3, iron 43, TIBC >493, sat NC, ferr 12     Sep, 2021: Advised patient " to start on oral iron once per day.      8/1/22: , SPEP with IF normal,      11/30/24:  -Returned for follow up for anemia. Patient lost follow up since March, 2022  -Clinically doing well today. Admits reasonable energy level. States that he is no longer feeling sluggish.   -States that he is taking blood builder and multi-vitamins daily.      3/25/2024:   - Patient presents for follow-up visit, his previous provider relocated therefore I have taken over his care, this is our first encounter together  - Patient reports that he saw GI last week due to ongoing hematochezia, they recommended a colonoscopy and EGD  - Patient states that he has not had any hematochezia in the last 2 weeks  - Patient reports he has a family history of esophageal cancer, he does have reflux but says it is controlled and denies dysphagia  - Patient reports that he has been feeling well and that his energy is good  - He states that he has no complaints or symptoms going on, and denies any changes or hospital stays since last visit  - Patient denies shortness of breath, chest pain, heart palpitations, melena, nausea, vomiting, constipation, diarrhea, fevers, chills, new lumps or bumps, edema or concerns  - Patient is on Protonix 40 mg daily, iron supplementation tablet with vitamin C daily, and vitamin B12 supplemental tablet once daily        6/3/2024: Consent:  A telephone visit (audio only) between the patient at home and the provider at [facility] [place] was utilized to provide this telehealth service.  - Patient presents for follow up   - He reports his hemorrhoidal bleeding has resolved, last episode was about a month ago and before that was 3 weeks before   - He is now on a fiber supplement daily   - He reports he had an EGD, colonoscopy and CT enterography; he reports he was notified no abnormalities   - He feels more energy and denies complaints/concerns   - Although his hemorrhoidal bleeding has improved he  "is still seeing colorectal surgery this week      10/2/2024: Consent:  A telephone visit (audio only) between the patient at home and the provider at [facility] [place] was utilized to provide this telehealth service.     - Patient presents for follow up   - Denies complaints or concerns   - He reports feeling well, is taking oral iron every other day and tolerating well  - He says he is not longer having bright red blood per rectum and denies other signs of bleeding  - Denies B symptoms   - He reports no family history of blood cancers      2/10/25:   -Patient presents for follow-up visit.  Recent blood work done 2/4/2025 shows WBC 4.9, hemoglobin 12.5, MCV 92, platelets 264,000, retake 2.3%, vitamin B12 1298, iron saturation 9%, ferritin 30, folate 16.2, haptoglobin 121, , EPO 8, creatinine 0.86, unremarkable hepatic function, that probably specific negative, flow cytometry negative, CHAI positive with negative reflex.     Patient says leading up to colonoscopy in April last year his rectal bleeding had went away. He had about 6 months without any bleeding. Slowly the bleeding has restarted. It has been more pronounced in last few months. He says he has been told there are surgical options if needed, he prefers to hold off on surgery at this time. He says bleeding varies, can be daily and has been more often in December and January. Amount seems small to medium \"depending\" with bowel movements. He says the bleeding has stopped in the last few days. Denies straining. He reports he feels good. Energy level is unchanged. Denies rashes. Some joint pain, mostly in left thumb and hands, his mom has a hx of RA. He drinks coffee and eats a diet that is more so vegetarian.      He follows with Cynthia Fofana NP in gastroenterology for rectal bleeding and hemorrhoids.  Recall his last colonoscopy was 4/23/2024.  He had a CTE on 5/31/2024.  An anoscopy on 6/5/2024 showed large internal nonbleeding " hemorrhoids.No history of IV iron. Remains on daily iron and PPI.        INTERVAL HISTORY     Keenan Wharton is a 59 y.o. male with PMH CAD, hemorrhoidal bleeding, GERD/Marie's esophagus, emphysema, raynaud's, optic migraines, who presents today for follow up of iron deficiency anemia. Currently on oral iron every other day.     Virtual or Telephone Consent    An interactive audio and video telecommunication system which permits real time communications between the patient (at the originating site) and provider (at the distant site) was utilized to provide this telehealth service.   Verbal consent was requested and obtained from Keenan Wharton on this date, 03/28/25 for a telehealth visit and the patient's location was confirmed at the time of the visit.    HPI  Patient presents for follow-up virtual visit to review recent lab work.  He reports that he was having hemorrhoidal bleeding from November up until a few weeks ago, it has not stopped.  He states that he did not do any interventions in particular but just has been doing better practices which has helped stop the bleeding.  He did try taking oral iron daily but had to stop due to intolerance and is now back on every other day.  He states that overall he is a little bit more tired but continues to stay active with no excessive fatigue.  He does feel better now that bleeding has stopped patient reports his appetite is good.    Patient denies chest pain, heart palpitations, shortness of breath, dyspnea on exertion, pica, restless legs, drenching night sweats, nausea, vomiting, melena, hematochezia, hematuria or other signs of bleeding, edema or other concerns.      Review of Systems - Oncology  ROS 14 points performed, See [facility] for exceptions    PMHx:  Past Medical History:   Diagnosis Date    GERD (gastroesophageal reflux disease)      Active Ambulatory Problems     Diagnosis Date Noted    Anemia 10/27/2023    Emphysema of lung (Multi) 10/27/2023     Gastroesophageal reflux disease without esophagitis 12/27/2017    Raynaud disease 10/27/2023    Osteopenia 03/14/2013    Ophthalmic migraine 10/27/2023    WBC decreased 11/29/2023    Nocturia 11/29/2023    Abnormal CT scan 11/29/2023     Resolved Ambulatory Problems     Diagnosis Date Noted    No Resolved Ambulatory Problems     Past Medical History:   Diagnosis Date    GERD (gastroesophageal reflux disease)       PSHx:  Past Surgical History:   Procedure Laterality Date    OTHER SURGICAL HISTORY  08/11/2021    Esophagogastroduodenoscopy    OTHER SURGICAL HISTORY  08/11/2021    Colonoscopy     FHx:  Family History   Problem Relation Name Age of Onset    Other (AAA) Father      Other (AAA) Brother        Social Hx:  Keenan Wharton    reports that he has never smoked. He has never used smokeless tobacco.  He  reports current alcohol use of about 16.0 standard drinks of alcohol per week.  He  reports that he does not currently use drugs.  Social History     Socioeconomic History    Marital status:      Spouse name: Caitlin    Number of children: 2   Occupational History    Occupation: , eviction work   Tobacco Use    Smoking status: Never    Smokeless tobacco: Never   Vaping Use    Vaping status: Never Used   Substance and Sexual Activity    Alcohol use: Yes     Alcohol/week: 16.0 standard drinks of alcohol     Types: 16 Standard drinks or equivalent per week     Comment: 10-12 drinks a week    Drug use: Not Currently        Objective    Visit Vitals  Smoking Status Never       Wt Readings from Last 5 Encounters:   02/10/25 68.5 kg (151 lb)   02/10/25 68.5 kg (151 lb 0.2 oz)   06/05/24 66.7 kg (147 lb)   05/15/24 62.6 kg (138 lb)   04/23/24 64 kg (141 lb)       PHYSICAL EXAM   Physical Exam  Full physical exam and vital signs not completed due to virtual visit  -On camera patient appears in no acute distress and he is alert and oriented    Allergies  Allergies   Allergen Reactions    Lactose Nausea/vomiting      Lactose intolerant      Medications  Current Outpatient Medications   Medication Instructions    cyanocobalamin (VITAMIN B-12) 250 mcg, Daily    ferrous sulfate 65 mg, Daily with breakfast    pantoprazole (ProtoNix) 40 mg EC tablet TAKE 1 TABLET BY MOUTH DAILY        Diagnostic Results   RESULTS     Results for orders placed or performed in visit on 03/26/25 (from the past 96 hours)   CBC and Auto Differential   Result Value Ref Range    WBC 4.0 (L) 4.4 - 11.3 x10*3/uL    nRBC 0.0 0.0 - 0.0 /100 WBCs    RBC 3.88 (L) 4.50 - 5.90 x10*6/uL    Hemoglobin 11.7 (L) 13.5 - 17.5 g/dL    Hematocrit 35.3 (L) 41.0 - 52.0 %    MCV 91 80 - 100 fL    MCH 30.2 26.0 - 34.0 pg    MCHC 33.1 32.0 - 36.0 g/dL    RDW 12.0 11.5 - 14.5 %    Platelets 266 150 - 450 x10*3/uL    Neutrophils % 55.4 40.0 - 80.0 %    Immature Granulocytes %, Automated 0.0 0.0 - 0.9 %    Lymphocytes % 32.1 13.0 - 44.0 %    Monocytes % 9.5 2.0 - 10.0 %    Eosinophils % 2.0 0.0 - 6.0 %    Basophils % 1.0 0.0 - 2.0 %    Neutrophils Absolute 2.23 1.20 - 7.70 x10*3/uL    Immature Granulocytes Absolute, Automated 0.00 0.00 - 0.70 x10*3/uL    Lymphocytes Absolute 1.29 1.20 - 4.80 x10*3/uL    Monocytes Absolute 0.38 0.10 - 1.00 x10*3/uL    Eosinophils Absolute 0.08 0.00 - 0.70 x10*3/uL    Basophils Absolute 0.04 0.00 - 0.10 x10*3/uL   Ferritin   Result Value Ref Range    Ferritin 21 20 - 300 ng/mL   Iron and TIBC   Result Value Ref Range    Iron 41 35 - 150 ug/dL    UIBC 425 (H) 110 - 370 ug/dL    TIBC 466 (H) 240 - 445 ug/dL    % Saturation 9 (L) 25 - 45 %       Lab Results   Component Value Date    WBC 4.0 (L) 03/26/2025    NEUTROABS 2.23 03/26/2025    IGABSOL 0.00 03/26/2025    LYMPHSABS 1.29 03/26/2025    MONOSABS 0.38 03/26/2025    EOSABS 0.08 03/26/2025    BASOSABS 0.04 03/26/2025    RBC 3.88 (L) 03/26/2025    MCV 91 03/26/2025    MCHC 33.1 03/26/2025    HGB 11.7 (L) 03/26/2025    HCT 35.3 (L) 03/26/2025     03/26/2025     Lab Results   Component Value  "Date    RETICCTPCT 2.3 (H) 02/04/2025      Lab Results   Component Value Date    CREATININE 0.86 02/04/2025    BUN 7 02/04/2025    EGFR >90 02/04/2025     02/04/2025    K 4.3 02/04/2025     02/04/2025    CO2 28 02/04/2025      Lab Results   Component Value Date    ALT 12 02/04/2025    AST 16 02/04/2025    ALKPHOS 61 02/04/2025    BILITOT 0.4 02/04/2025      Lab Results   Component Value Date    TSH 1.25 07/02/2020     Lab Results   Component Value Date    TSH 1.25 07/02/2020     Lab Results   Component Value Date    IRON 41 03/26/2025    TIBC 466 (H) 03/26/2025    FERRITIN 21 03/26/2025      Lab Results   Component Value Date    RPLILFIV35 1,298 (H) 02/04/2025      Lab Results   Component Value Date    FOLATE 16.2 02/04/2025     Lab Results   Component Value Date    CHAI Positive (A) 02/04/2025    RF <10 02/04/2025    SEDRATE 20 02/04/2025      Lab Results   Component Value Date    CRP 0.92 02/04/2025      No results found for: \"ELLIOT\"  Lab Results   Component Value Date     02/04/2025     Lab Results   Component Value Date    HAPTOGLOBIN 121 02/04/2025     Lab Results   Component Value Date    SPEP NORMAL 08/01/2022     No results found for: \"IGG\", \"IGM\", \"IGA\"  Lab Results   Component Value Date    HEPCAB NONREACTIVE 08/13/2021     No results found for: \"HIV1X2\"    Assessment/Plan   ASSESSMENT/PLAN     Keenan Wharton is a 59 y.o. male with PMH CAD, hemorrhoidal bleeding, GERD/Marie's esophagus, emphysema, raynaud's, optic migraines, who presents today for follow up of iron deficiency anemia. Currently on oral iron every other day.     Anemia  -normocytic anemia since at least July 2020  -Labs done on 10/30/23: Hgb 12.8, MCV 92, ferritin 39, iron saturation 26%, reticulocyte counts 1.5%, vitamin B12 285, folate 17.4, creatinine 0.86  -Previously plasma dyscrasia excluded.    -Currently, patient is taking blood builder (contains iron possibly) and multi-vitamins daily.   -Since his iron level and " vitamin B12 are borderline low, advised patient to start on ferrous sulfate one tab per day and vitamin B12 1000 mcg once per day.   -I will see him back in 3 months to follow up with labs done a few days prior to the next vsiit.      3/25/2024:   - PMH emphysema, raynaud disease and chronic NATIVIDAD dating to 2021   - Saw GI 3/20/24 for worsening hematochezia   - Labs from 2/26/24 show: WBC 5.2, hgb 11.8, plt 231,000, lymphocytes 1080, vitamin B12 647, iron saturation 6%, ferritin 21  - Going to have EGD and colonoscopy in about a month   - On protonix 40 mg orally daily   - On ferrous sulfate and vitamin B12 daily   - Has not had rectal bleeding for the last 2 weeks  - Reports feeling well, energy is good, appetite is intact with no unintentional weight loss  - Patient's labs were done about 4 weeks ago, based on his improvement of bleeding and overall feeling well with stable VSS he prefers to hold off on further lab work today   - Therefore, we will have him repeat labs 8 weeks from last set of labs (to equal 12 weeks apart) which would be about the end of May with a phone visit to review labs      6/3/2024:  - Patient presents for follow up   - Labs done 5/28/24: WBC 4.0, hgb 13.4, plt 251,000, iron saturation 23%, ferritin 25, vitamin B12 654  - His hgb has improved from 11.8 and iron saturation from 6%   - He is on oral iron every other day; I advised him to continue   - He will return for labs a FUV in 3 months to evaluate if he needs to continue his oral iron or not     10/2/2024:  - Presents for follow up   - Labs show WBC 3.9, hgb 13.1, MCV 90, plt 243,000, ALC 1,180, ANC 2,280, vitamin B12 951, iron saturation 26%, ferritin 31  - He will decrease vitamin B12 to every other day, he will continue oral iron every other day   - We discussed that his iron indices have improved, however, he remains with mild anemia with hgb 13.1 and lymphocytes remain slightly low as well   - Previously plasma dyscrasia excluded  (8/2022)  - No clear indication for ongoing normocytic anemia in regards to his PMH, medications or lab work   - Discussed with patient we can check a flow cytometry due to mildly low lymphocytes and hgb, he agrees with plan in addition to further anemia evaluation with lab work for now, and may need to discuss bone marrow biopsy in the future if labs do not improve/worsen   - He prefers to return in February   - He will have labs a week prior      2/10/2025:   - Presents for follow up visit  - WBC 4.9, hemoglobin 12.5, MCV 92, platelets 264,000, retake 2.3%, vitamin B12 1298, iron saturation 9%, ferritin 30, folate 16.2, haptoglobin 121, , EPO 8, creatinine 0.86, unremarkable hepatic function, that probably specific negative, flow cytometry negative, CHAI positive with negative reflex.  - Discussed results with patient including positive CHAI with negative reflex, will hold off on rheumatology referral at this time   - Negative flow cytometry drawn for leukopenia (WBC now normal)  - Discussed hgb and iron saturation have come down, which correlates with increased hemorrhoidal bleeding he has experienced lately, which has not subsided he reports   - In addition, he is on PPI daily   - Discussed IV iron but patient prefers to hold off   - He will continue oral iron daily but will take later in the day, away from his PPI   - Discussed iron rich handouts, given to patient   - Discussed importance to call if bleeding returns or signs/symptoms of anemia develop   - We will do a closer FUV with him to monitor counts closely, RTC in 6 weeks for phone visit with labs ahead of time     3/28/2025:  -Patient presents for follow-up visit, he has a history of recurrent hemorrhoidal bleeding and iron deficiency anemia  -Recent hemorrhoidal bleeding lasted from November until a few weeks ago, bleeding has now stopped and he reports feeling better  -Labs done on 3/26/2025: WBC 4.0 with normal differential, hemoglobin 11.7, MCV  91, platelets 266,000, iron saturation 9%, TIBC 466, serum iron 41, ferritin 21  -Discussed with patient that his hemoglobin has come down further and he remains iron deficient  -Discussed options including IV iron versus continuing oral only and monitoring closely  -Patient would like to proceed with just 1 dose of IV Venofer at this time for a boost and will continue oral iron every other day  -Reviewed drug information with patient including risk of hypersensitivity that can be mild-moderate or severe, potential side effects and infusion routine with the medication  -We will repeat blood work about 8 weeks from his dose of IV iron, we will keep an eye out for these results and call him  -Advised patient to call if he has recurrent bleeding, new or worsening symptoms in the meantime  -We will plan for a follow-up 3 months after next set of blood work which is about 5 months from now, so return to clinic in early September with labs ahead of time.  He is aware to hold his oral iron and vitamin B12 on days of blood draw and the day before       2. GERD/Marie's Esophagus   -Currently on pantoprazole         Diagnoses and all orders for this visit:  Iron deficiency anemia due to chronic blood loss  -     CBC and Auto Differential; Future  -     Comprehensive Metabolic Panel; Future  -     Ferritin; Future  -     Iron and TIBC; Future  -     Vitamin B12; Future  -     Clinic Appointment Request Follow up; [facility]; Future  -     CBC and Auto Differential; Future  -     Comprehensive Metabolic Panel; Future  -     Ferritin; Future  -     Iron and TIBC; Future  Anemia, unspecified type  -     Clinic Appointment Request (phone); [facility] (phone); (phone)  -     CBC and Auto Differential; Future  -     Comprehensive Metabolic Panel; Future  -     Ferritin; Future  -     Iron and TIBC; Future  -     Vitamin B12; Future  -     Clinic Appointment Request Follow up; [facility]; Future  -     CBC  and Auto Differential; Future  -     Comprehensive Metabolic Panel; Future  -     Ferritin; Future  -     Iron and TIBC; Future  Other orders  -     heparin flush 10 unit/mL syringe 50 Units  -     heparin flush 100 unit/mL syringe 500 Units  -     alteplase (Cathflo Activase) injection 2 mg  -     iron sucrose (Venofer) 300 mg in sodium chloride 0.9% 250 mL IV  -     sodium chloride 0.9 % bolus 500 mL  -     dextrose 5 % in water (D5W) bolus 500 mL  -     diphenhydrAMINE (BENADryl) injection 50 mg  -     methylPREDNISolone sod succinate (SOLU-Medrol) 40 mg/mL injection 40 mg  -     famotidine PF (Pepcid) injection 20 mg  -     EPINEPHrine (Epipen) injection syringe 0.3 mg  -     albuterol 2.5 mg /3 mL (0.083 %) nebulizer solution 3 mL        Potential diagnoses, treatment options, and plan of care discussed with patient. Patient verbalizes understanding of above plan. Time provided for patient's questions. All questions answered to patient's satisfaction in office. Patient instructed to reach out for any new concerning issues at [phone].    Dusty Olvera, APRN-CNP

## 2025-03-27 NOTE — TELEPHONE ENCOUNTER
Message left for patient regarding need to change appointment tomorrow with Dusty to video call or in person visit.

## 2025-03-28 ENCOUNTER — TELEMEDICINE (OUTPATIENT)
Dept: HEMATOLOGY/ONCOLOGY | Facility: CLINIC | Age: 60
End: 2025-03-28
Payer: COMMERCIAL

## 2025-03-28 DIAGNOSIS — D50.0 IRON DEFICIENCY ANEMIA DUE TO CHRONIC BLOOD LOSS: Primary | ICD-10-CM

## 2025-03-28 DIAGNOSIS — D64.9 ANEMIA, UNSPECIFIED TYPE: ICD-10-CM

## 2025-03-28 PROCEDURE — 99214 OFFICE O/P EST MOD 30 MIN: CPT

## 2025-03-28 RX ORDER — ALBUTEROL SULFATE 0.83 MG/ML
3 SOLUTION RESPIRATORY (INHALATION) AS NEEDED
OUTPATIENT
Start: 2025-04-08

## 2025-03-28 RX ORDER — HEPARIN 100 UNIT/ML
500 SYRINGE INTRAVENOUS AS NEEDED
OUTPATIENT
Start: 2025-03-28

## 2025-03-28 RX ORDER — FAMOTIDINE 10 MG/ML
20 INJECTION, SOLUTION INTRAVENOUS ONCE AS NEEDED
OUTPATIENT
Start: 2025-04-08

## 2025-03-28 RX ORDER — HEPARIN SODIUM,PORCINE/PF 10 UNIT/ML
50 SYRINGE (ML) INTRAVENOUS AS NEEDED
OUTPATIENT
Start: 2025-03-28

## 2025-03-28 RX ORDER — EPINEPHRINE 0.3 MG/.3ML
0.3 INJECTION SUBCUTANEOUS EVERY 5 MIN PRN
OUTPATIENT
Start: 2025-04-08

## 2025-03-28 RX ORDER — DIPHENHYDRAMINE HYDROCHLORIDE 50 MG/ML
50 INJECTION, SOLUTION INTRAMUSCULAR; INTRAVENOUS AS NEEDED
OUTPATIENT
Start: 2025-04-08

## 2025-04-09 ENCOUNTER — TELEPHONE (OUTPATIENT)
Dept: HEMATOLOGY/ONCOLOGY | Facility: CLINIC | Age: 60
End: 2025-04-09
Payer: COMMERCIAL

## 2025-04-09 ENCOUNTER — APPOINTMENT (OUTPATIENT)
Dept: HEMATOLOGY/ONCOLOGY | Facility: CLINIC | Age: 60
End: 2025-04-09
Payer: COMMERCIAL

## 2025-04-09 DIAGNOSIS — D50.0 IRON DEFICIENCY ANEMIA DUE TO CHRONIC BLOOD LOSS: Primary | ICD-10-CM

## 2025-04-09 NOTE — TELEPHONE ENCOUNTER
Nicolasahart message received from Keenan, he is unable to come today for scheduled infusion apt for Venofer. I called and spoke with Keenan, he would like to reschedule for next Wednesday 4/16. Apt rescheduled - pt confirmed time/date. Pt had no further questions at this time.

## 2025-04-16 ENCOUNTER — INFUSION (OUTPATIENT)
Dept: HEMATOLOGY/ONCOLOGY | Facility: CLINIC | Age: 60
End: 2025-04-16
Payer: COMMERCIAL

## 2025-04-16 VITALS
OXYGEN SATURATION: 99 % | HEART RATE: 53 BPM | TEMPERATURE: 97.5 F | DIASTOLIC BLOOD PRESSURE: 78 MMHG | WEIGHT: 149.91 LBS | BODY MASS INDEX: 23.48 KG/M2 | SYSTOLIC BLOOD PRESSURE: 117 MMHG | RESPIRATION RATE: 16 BRPM

## 2025-04-16 DIAGNOSIS — D50.0 IRON DEFICIENCY ANEMIA DUE TO CHRONIC BLOOD LOSS: ICD-10-CM

## 2025-04-16 PROCEDURE — 96365 THER/PROPH/DIAG IV INF INIT: CPT | Mod: INF

## 2025-04-16 PROCEDURE — 2500000004 HC RX 250 GENERAL PHARMACY W/ HCPCS (ALT 636 FOR OP/ED): Mod: JZ

## 2025-04-16 RX ORDER — HEPARIN SODIUM,PORCINE/PF 10 UNIT/ML
50 SYRINGE (ML) INTRAVENOUS AS NEEDED
OUTPATIENT
Start: 2025-04-16

## 2025-04-16 RX ORDER — DIPHENHYDRAMINE HYDROCHLORIDE 50 MG/ML
50 INJECTION, SOLUTION INTRAMUSCULAR; INTRAVENOUS AS NEEDED
OUTPATIENT
Start: 2025-04-16

## 2025-04-16 RX ORDER — EPINEPHRINE 0.3 MG/.3ML
0.3 INJECTION SUBCUTANEOUS EVERY 5 MIN PRN
OUTPATIENT
Start: 2025-04-16

## 2025-04-16 RX ORDER — HEPARIN 100 UNIT/ML
500 SYRINGE INTRAVENOUS AS NEEDED
OUTPATIENT
Start: 2025-04-16

## 2025-04-16 RX ORDER — ALBUTEROL SULFATE 0.83 MG/ML
3 SOLUTION RESPIRATORY (INHALATION) AS NEEDED
OUTPATIENT
Start: 2025-04-16

## 2025-04-16 RX ORDER — FAMOTIDINE 10 MG/ML
20 INJECTION, SOLUTION INTRAVENOUS ONCE AS NEEDED
OUTPATIENT
Start: 2025-04-16

## 2025-04-16 RX ADMIN — IRON SUCROSE 300 MG: 20 INJECTION, SOLUTION INTRAVENOUS at 14:52

## 2025-04-16 ASSESSMENT — PAIN SCALES - GENERAL: PAINLEVEL_OUTOF10: 0-NO PAIN

## 2025-04-16 NOTE — PATIENT INSTRUCTIONS
Please have repeat labs drawn per Dusty's orders on/around June 2nd (CBC, CMP, Iron & TIBC, Ferritin, Vitamin B12).    You will need a second set of labs drawn prior to your follow up appointment on 9/5. Please have those drawn on/around September 1st (CBC, CMP, Iron & TIBC, Ferritin).

## 2025-05-06 ENCOUNTER — OFFICE VISIT (OUTPATIENT)
Dept: CARDIOLOGY | Facility: CLINIC | Age: 60
End: 2025-05-06
Payer: COMMERCIAL

## 2025-05-06 VITALS
BODY MASS INDEX: 23.07 KG/M2 | RESPIRATION RATE: 18 BRPM | OXYGEN SATURATION: 97 % | HEIGHT: 67 IN | SYSTOLIC BLOOD PRESSURE: 104 MMHG | WEIGHT: 147 LBS | DIASTOLIC BLOOD PRESSURE: 70 MMHG | HEART RATE: 61 BPM

## 2025-05-06 DIAGNOSIS — E78.49 OTHER HYPERLIPIDEMIA: Primary | ICD-10-CM

## 2025-05-06 DIAGNOSIS — Z82.49 FAMILY HISTORY OF CARDIOVASCULAR DISEASE: ICD-10-CM

## 2025-05-06 PROCEDURE — 93005 ELECTROCARDIOGRAM TRACING: CPT | Performed by: INTERNAL MEDICINE

## 2025-05-06 PROCEDURE — 99214 OFFICE O/P EST MOD 30 MIN: CPT | Mod: 25 | Performed by: INTERNAL MEDICINE

## 2025-05-06 PROCEDURE — 99214 OFFICE O/P EST MOD 30 MIN: CPT | Performed by: INTERNAL MEDICINE

## 2025-05-06 PROCEDURE — 93010 ELECTROCARDIOGRAM REPORT: CPT | Performed by: INTERNAL MEDICINE

## 2025-05-06 PROCEDURE — 3008F BODY MASS INDEX DOCD: CPT | Performed by: INTERNAL MEDICINE

## 2025-05-06 NOTE — PROGRESS NOTES
CHIEF COMPLAINT: routine follow-up visit    HISTORY OF PRESENT ILLNESS:    PCP: Dr. Keenan Easley     Mr. Wharton is a 60 yo F with hx of GERD/?Marie's and anemia who is newly referred to Cardiology Clinic by PCP to establish care/prevention given strong family hx of CAD. He feels well and denies CP, SOB, dizziness, LH, LE edema, or palpitations. Active working out about 4 times per week (running or bootcamp). Does not eat red meat other than occasional game, but does eat chicken and pork. Loves ice cream and eats regularly. ECG shows NSR. Drinks 8-10 ETOH drinks per week. Saw Cardiology Dr. Trimble at [facility] in 2018 for prevention and had screening AAA US (negative) done given brother had AAA. Prior cardiac testing per patient is treadmill stress test in his 30s (negative) for MSK pain and nuclear stress test in his 40s. Dad had first MI in his late 40s and brother also had MI in his 40s s/p multiple stents and an ICD.     PRIOR CARDIAC TESTING:  -CAC (2022): 0     Allergies   Allergen Reactions    Lactose Nausea/vomiting     Lactose intolerant     Current Outpatient Medications:     cyanocobalamin (Vitamin B-12) 250 mcg tablet, Take 1 tablet (250 mcg) by mouth once daily., Disp: , Rfl:     ferrous sulfate 325 (65 Fe) MG EC tablet, Take 65 mg by mouth once daily with a meal. Do not crush, chew, or split., Disp: , Rfl:     pantoprazole (ProtoNix) 40 mg EC tablet, TAKE 1 TABLET BY MOUTH DAILY, Disp: 30 tablet, Rfl: 11    There were no vitals taken for this visit.    PHYSICAL EXAM:  GENERAL: NAD  HEENT: no JVD  CV: RRR without m/r/g  PULM: CTAB  EXT: non-edematous bilateral lower extremities     LABS  WBC (x10*3/uL)   Date Value   03/26/2025 4.0 (L)     Hemoglobin (g/dL)   Date Value   03/26/2025 11.7 (L)     Platelets (x10*3/uL)   Date Value   03/26/2025 266     Sodium (mmol/L)   Date Value   02/04/2025 138     Potassium (mmol/L)   Date Value   02/04/2025 4.3     Chloride (mmol/L)   Date Value   02/04/2025 103      Bicarbonate (mmol/L)   Date Value   02/04/2025 28     Urea Nitrogen (mg/dL)   Date Value   02/04/2025 7     Creatinine (mg/dL)   Date Value   02/04/2025 0.86     Calcium (mg/dL)   Date Value   02/04/2025 9.5     Total Protein (g/dL)   Date Value   02/04/2025 7.1     Bilirubin, Total (mg/dL)   Date Value   02/04/2025 0.4     Alkaline Phosphatase (U/L)   Date Value   02/04/2025 61     ALT (U/L)   Date Value   02/04/2025 12     AST (U/L)   Date Value   02/04/2025 16     Glucose (mg/dL)   Date Value   02/04/2025 90     Cholesterol (mg/dL)   Date Value   10/30/2023 196   08/23/2022 191   08/13/2021 187   07/02/2020 179     CHOLESTEROL, TOTAL (mg/dL)   Date Value   02/18/2025 206 (H)     LDL-CHOLESTEROL (mg/dL (calc))   Date Value   02/18/2025 133 (H)     LDL Calculated (mg/dL)   Date Value   10/30/2023 116 (H)     HDL CHOLESTEROL (mg/dL)   Date Value   02/18/2025 55     HDL-Cholesterol (mg/dL)   Date Value   10/30/2023 63.8     HDL (mg/dL)   Date Value   08/23/2022 68.0   08/13/2021 62.0   07/02/2020 60.1     TRIGLYCERIDES (mg/dL)   Date Value   02/18/2025 84     Triglycerides (mg/dL)   Date Value   10/30/2023 80   08/23/2022 60   08/13/2021 64   07/02/2020 67     HEMOGLOBIN A1c (% of total Hgb)   Date Value   02/18/2025 5.2     Hemoglobin A1C (%)   Date Value   10/30/2023 5.0     Lab Results   Component Value Date    LDLF 111 (H) 08/23/2022     ASSESSMENT/PLAN: 58 yo F with hx of GERD/?Marie's and anemia here to establish care/prevention given strong family hx of CAD. Discussed CV risk factor reduction at length. Exercise and majority of diet is great; will cut out some ice cream to help lower . Check Lpa given strong family hx. RTC prn.     Flp  Rtc 1 year

## 2025-05-09 LAB
ATRIAL RATE: 61 BPM
P AXIS: 72 DEGREES
P OFFSET: 190 MS
P ONSET: 138 MS
PR INTERVAL: 164 MS
Q ONSET: 220 MS
QRS COUNT: 10 BEATS
QRS DURATION: 84 MS
QT INTERVAL: 388 MS
QTC CALCULATION(BAZETT): 390 MS
QTC FREDERICIA: 389 MS
R AXIS: 79 DEGREES
T AXIS: 68 DEGREES
T OFFSET: 414 MS
VENTRICULAR RATE: 61 BPM

## 2025-05-10 LAB
CHOLEST SERPL-MCNC: 196 MG/DL
CHOLEST/HDLC SERPL: 2.9 (CALC)
HDLC SERPL-MCNC: 67 MG/DL
LDLC SERPL CALC-MCNC: 114 MG/DL (CALC)
NONHDLC SERPL-MCNC: 129 MG/DL (CALC)
TRIGL SERPL-MCNC: 64 MG/DL

## 2025-05-13 ENCOUNTER — TELEPHONE (OUTPATIENT)
Dept: CARDIOLOGY | Facility: CLINIC | Age: 60
End: 2025-05-13
Payer: COMMERCIAL

## 2025-05-13 NOTE — TELEPHONE ENCOUNTER
----- Message from Marlene Lizarraga sent at 5/12/2025  4:51 PM EDT -----  Please let patient know LDL has improved from 133 to 114 with his dietary changes. Great job!  ----- Message -----  From: [facility] Results In  Sent: 5/10/2025  10:40 AM EDT  To: Marlene Lizarraga MD

## 2025-06-02 DIAGNOSIS — D64.9 ANEMIA, UNSPECIFIED TYPE: ICD-10-CM

## 2025-06-02 DIAGNOSIS — D50.0 IRON DEFICIENCY ANEMIA DUE TO CHRONIC BLOOD LOSS: ICD-10-CM

## 2025-06-12 DIAGNOSIS — D50.0 IRON DEFICIENCY ANEMIA DUE TO CHRONIC BLOOD LOSS: Primary | ICD-10-CM

## 2025-07-01 DIAGNOSIS — D50.0 IRON DEFICIENCY ANEMIA DUE TO CHRONIC BLOOD LOSS: ICD-10-CM

## 2025-07-02 ENCOUNTER — LAB (OUTPATIENT)
Dept: LAB | Facility: HOSPITAL | Age: 60
End: 2025-07-02
Payer: COMMERCIAL

## 2025-07-02 DIAGNOSIS — D50.0 IRON DEFICIENCY ANEMIA SECONDARY TO BLOOD LOSS (CHRONIC): Primary | ICD-10-CM

## 2025-07-02 LAB
BASOPHILS # BLD AUTO: 0.02 X10*3/UL (ref 0–0.1)
BASOPHILS NFR BLD AUTO: 0.5 %
EOSINOPHIL # BLD AUTO: 0.08 X10*3/UL (ref 0–0.7)
EOSINOPHIL NFR BLD AUTO: 1.9 %
ERYTHROCYTE [DISTWIDTH] IN BLOOD BY AUTOMATED COUNT: 13.3 % (ref 11.5–14.5)
FERRITIN SERPL-MCNC: 39 NG/ML (ref 20–300)
HCT VFR BLD AUTO: 38.4 % (ref 41–52)
HGB BLD-MCNC: 12.7 G/DL (ref 13.5–17.5)
IMM GRANULOCYTES # BLD AUTO: 0.01 X10*3/UL (ref 0–0.7)
IMM GRANULOCYTES NFR BLD AUTO: 0.2 % (ref 0–0.9)
IRON SATN MFR SERPL: 18 % (ref 25–45)
IRON SERPL-MCNC: 71 UG/DL (ref 35–150)
LYMPHOCYTES # BLD AUTO: 1.31 X10*3/UL (ref 1.2–4.8)
LYMPHOCYTES NFR BLD AUTO: 30.8 %
MCH RBC QN AUTO: 29.2 PG (ref 26–34)
MCHC RBC AUTO-ENTMCNC: 33.1 G/DL (ref 32–36)
MCV RBC AUTO: 88 FL (ref 80–100)
MONOCYTES # BLD AUTO: 0.38 X10*3/UL (ref 0.1–1)
MONOCYTES NFR BLD AUTO: 8.9 %
NEUTROPHILS # BLD AUTO: 2.46 X10*3/UL (ref 1.2–7.7)
NEUTROPHILS NFR BLD AUTO: 57.7 %
NRBC BLD-RTO: 0 /100 WBCS (ref 0–0)
PLATELET # BLD AUTO: 227 X10*3/UL (ref 150–450)
RBC # BLD AUTO: 4.35 X10*6/UL (ref 4.5–5.9)
TIBC SERPL-MCNC: 405 UG/DL (ref 240–445)
UIBC SERPL-MCNC: 334 UG/DL (ref 110–370)
WBC # BLD AUTO: 4.3 X10*3/UL (ref 4.4–11.3)

## 2025-07-02 PROCEDURE — 85025 COMPLETE CBC W/AUTO DIFF WBC: CPT

## 2025-07-02 PROCEDURE — 83550 IRON BINDING TEST: CPT

## 2025-07-02 PROCEDURE — 83540 ASSAY OF IRON: CPT

## 2025-07-02 PROCEDURE — 36415 COLL VENOUS BLD VENIPUNCTURE: CPT

## 2025-07-02 PROCEDURE — 82728 ASSAY OF FERRITIN: CPT

## 2025-07-14 ENCOUNTER — TELEPHONE (OUTPATIENT)
Dept: HEMATOLOGY/ONCOLOGY | Facility: CLINIC | Age: 60
End: 2025-07-14
Payer: COMMERCIAL

## 2025-07-14 NOTE — TELEPHONE ENCOUNTER
"I called and left Kerry message, notifying him that Dusty NP reviewed his blood work from 7/2/25 and that per Dusty \" his hgb and iron studies have improved some\", he is aware to follow his plan as scheduled (per dusty). I did review that he will have a FUV with Dusty beginning of September with blood work 1-2 days before his visit. Call back information was left and it was encouraged that he call the clinic with any questions.   "

## 2025-09-03 PROCEDURE — 80053 COMPREHEN METABOLIC PANEL: CPT

## 2025-09-03 PROCEDURE — 82728 ASSAY OF FERRITIN: CPT

## 2025-09-03 PROCEDURE — 85025 COMPLETE CBC W/AUTO DIFF WBC: CPT

## 2025-09-03 PROCEDURE — 83550 IRON BINDING TEST: CPT

## 2025-09-03 PROCEDURE — 83540 ASSAY OF IRON: CPT

## 2025-09-04 ENCOUNTER — TELEPHONE (OUTPATIENT)
Dept: HEMATOLOGY/ONCOLOGY | Facility: CLINIC | Age: 60
End: 2025-09-04
Payer: COMMERCIAL

## 2025-09-05 ENCOUNTER — TELEMEDICINE (OUTPATIENT)
Dept: HEMATOLOGY/ONCOLOGY | Facility: CLINIC | Age: 60
End: 2025-09-05
Payer: COMMERCIAL

## 2025-09-05 DIAGNOSIS — D64.9 ANEMIA, UNSPECIFIED TYPE: ICD-10-CM

## 2025-09-05 DIAGNOSIS — D50.0 IRON DEFICIENCY ANEMIA DUE TO CHRONIC BLOOD LOSS: ICD-10-CM

## 2025-09-05 PROCEDURE — 99214 OFFICE O/P EST MOD 30 MIN: CPT
